# Patient Record
Sex: MALE | Race: WHITE | NOT HISPANIC OR LATINO | Employment: OTHER | URBAN - NONMETROPOLITAN AREA
[De-identification: names, ages, dates, MRNs, and addresses within clinical notes are randomized per-mention and may not be internally consistent; named-entity substitution may affect disease eponyms.]

---

## 2023-10-20 ENCOUNTER — OFFICE VISIT (OUTPATIENT)
Dept: PRIMARY CARE | Facility: CLINIC | Age: 73
End: 2023-10-20
Payer: MEDICARE

## 2023-10-20 VITALS
DIASTOLIC BLOOD PRESSURE: 74 MMHG | WEIGHT: 202 LBS | SYSTOLIC BLOOD PRESSURE: 116 MMHG | TEMPERATURE: 97.5 F | BODY MASS INDEX: 29.85 KG/M2 | OXYGEN SATURATION: 97 % | HEART RATE: 75 BPM

## 2023-10-20 DIAGNOSIS — I10 PRIMARY HYPERTENSION: Primary | ICD-10-CM

## 2023-10-20 DIAGNOSIS — N52.9 ERECTILE DYSFUNCTION, UNSPECIFIED ERECTILE DYSFUNCTION TYPE: ICD-10-CM

## 2023-10-20 DIAGNOSIS — I48.0 INTERMITTENT ATRIAL FIBRILLATION (MULTI): ICD-10-CM

## 2023-10-20 DIAGNOSIS — M75.51 BURSITIS OF RIGHT SHOULDER: ICD-10-CM

## 2023-10-20 DIAGNOSIS — M54.2 CERVICALGIA: ICD-10-CM

## 2023-10-20 PROCEDURE — 1159F MED LIST DOCD IN RCRD: CPT | Performed by: FAMILY MEDICINE

## 2023-10-20 PROCEDURE — 96372 THER/PROPH/DIAG INJ SC/IM: CPT | Performed by: FAMILY MEDICINE

## 2023-10-20 PROCEDURE — 3078F DIAST BP <80 MM HG: CPT | Performed by: FAMILY MEDICINE

## 2023-10-20 PROCEDURE — 3074F SYST BP LT 130 MM HG: CPT | Performed by: FAMILY MEDICINE

## 2023-10-20 PROCEDURE — 99204 OFFICE O/P NEW MOD 45 MIN: CPT | Performed by: FAMILY MEDICINE

## 2023-10-20 PROCEDURE — 1036F TOBACCO NON-USER: CPT | Performed by: FAMILY MEDICINE

## 2023-10-20 PROCEDURE — 20553 NJX 1/MLT TRIGGER POINTS 3/>: CPT | Performed by: FAMILY MEDICINE

## 2023-10-20 RX ORDER — LISINOPRIL 5 MG/1
5 TABLET ORAL
COMMUNITY
Start: 2014-08-12 | End: 2024-03-01 | Stop reason: WASHOUT

## 2023-10-20 RX ORDER — DEXTROMETHORPHAN HYDROBROMIDE, GUAIFENESIN 5; 100 MG/5ML; MG/5ML
650 LIQUID ORAL EVERY 8 HOURS PRN
Status: ON HOLD | COMMUNITY
End: 2024-02-02 | Stop reason: WASHOUT

## 2023-10-20 RX ORDER — TRIAMCINOLONE ACETONIDE 40 MG/ML
40 INJECTION, SUSPENSION INTRA-ARTICULAR; INTRAMUSCULAR ONCE
Status: COMPLETED | OUTPATIENT
Start: 2023-10-20 | End: 2023-10-20

## 2023-10-20 RX ORDER — SILDENAFIL 100 MG/1
100 TABLET, FILM COATED ORAL
COMMUNITY
Start: 2011-01-17 | End: 2023-10-20 | Stop reason: SDUPTHER

## 2023-10-20 RX ORDER — SILDENAFIL 100 MG/1
100 TABLET, FILM COATED ORAL AS NEEDED
Qty: 10 TABLET | Refills: 11 | Status: SHIPPED | OUTPATIENT
Start: 2023-10-20

## 2023-10-20 RX ORDER — LIDOCAINE HYDROCHLORIDE 20 MG/ML
40 INJECTION, SOLUTION INFILTRATION; PERINEURAL ONCE
Status: COMPLETED | OUTPATIENT
Start: 2023-10-20 | End: 2023-10-20

## 2023-10-20 RX ORDER — APIXABAN 5 MG/1
5 TABLET, FILM COATED ORAL 2 TIMES DAILY
COMMUNITY
Start: 2023-07-31 | End: 2024-05-06 | Stop reason: SDUPTHER

## 2023-10-20 RX ORDER — METOPROLOL TARTRATE 50 MG/1
50 TABLET ORAL DAILY
Status: ON HOLD | COMMUNITY
Start: 2023-08-10 | End: 2024-02-02 | Stop reason: DRUGHIGH

## 2023-10-20 RX ORDER — ASPIRIN 81 MG/1
81 TABLET ORAL DAILY
COMMUNITY
Start: 2014-01-22

## 2023-10-20 RX ADMIN — TRIAMCINOLONE ACETONIDE 40 MG: 40 INJECTION, SUSPENSION INTRA-ARTICULAR; INTRAMUSCULAR at 13:05

## 2023-10-20 RX ADMIN — LIDOCAINE HYDROCHLORIDE 40 MG: 20 INJECTION, SOLUTION INFILTRATION; PERINEURAL at 13:04

## 2023-10-20 ASSESSMENT — ENCOUNTER SYMPTOMS
VOMITING: 0
ARTHRALGIAS: 1
SLEEP DISTURBANCE: 0
SINUS PRESSURE: 0
SORE THROAT: 0
RHINORRHEA: 0
HEADACHES: 0
ABDOMINAL PAIN: 0
FLANK PAIN: 0
WHEEZING: 0
NERVOUS/ANXIOUS: 0
DYSPHORIC MOOD: 0
DYSURIA: 0
WEAKNESS: 0
NAUSEA: 0
BLOOD IN STOOL: 0
FEVER: 0
NECK PAIN: 1
NUMBNESS: 0
EYE ITCHING: 0
DIFFICULTY URINATING: 1
JOINT SWELLING: 0
SHORTNESS OF BREATH: 0
LIGHT-HEADEDNESS: 0
ACTIVITY CHANGE: 0
EYE DISCHARGE: 0
APPETITE CHANGE: 0
PALPITATIONS: 1
DIARRHEA: 0
UNEXPECTED WEIGHT CHANGE: 0
CONSTIPATION: 0
HEMATURIA: 0
MYALGIAS: 0
DIZZINESS: 0
COUGH: 0

## 2023-10-20 NOTE — PATIENT INSTRUCTIONS
PLAN A WELLNESS IN 2-24   LABS AHEAD OF TIME  CMP, LIPID PANEL   CONTINUE THE MEDICATIONS   CHRONIC BURSITIS IN RIGHT SHOULDER /kenalogue injected into trigger areas around the shoulder

## 2023-10-20 NOTE — PROGRESS NOTES
Subjective   Patient ID: Fabricio Bajwa is a 73 y.o. male who presents for Shoulder Pain (B-skrjyxfa-rlaipoo for a long time. Ilia does hip injection.).    HPI HAD MI AND PACER DEFIBRILLATOR IN 2014     Review of Systems   Constitutional:  Negative for activity change, appetite change, fever and unexpected weight change.   HENT:  Negative for congestion, ear pain, postnasal drip, rhinorrhea, sinus pressure and sore throat.    Eyes:  Negative for discharge, itching and visual disturbance.   Respiratory:  Negative for cough, shortness of breath and wheezing.    Cardiovascular:  Positive for palpitations. Negative for chest pain and leg swelling.        CAD A INTERMITTENT A FIB    Gastrointestinal:  Negative for abdominal pain, blood in stool, constipation, diarrhea, nausea and vomiting.   Endocrine: Negative for cold intolerance, heat intolerance and polyuria.   Genitourinary:  Positive for difficulty urinating. Negative for dysuria, flank pain and hematuria.        ED   Musculoskeletal:  Positive for arthralgias and neck pain. Negative for gait problem, joint swelling and myalgias.        Previous diagnosis of cervicalgia and got trigger point injections from prior pcp      Skin:  Negative for rash.   Allergic/Immunologic: Negative for environmental allergies and food allergies.   Neurological:  Negative for dizziness, syncope, weakness, light-headedness, numbness and headaches.   Psychiatric/Behavioral:  Negative for dysphoric mood and sleep disturbance. The patient is not nervous/anxious.        Objective   /74   Pulse 75   Temp 36.4 °C (97.5 °F)   Wt 91.6 kg (202 lb)   SpO2 97%   BMI 29.85 kg/m²     Physical Exam  Vitals and nursing note reviewed.   Constitutional:       Appearance: Normal appearance.   HENT:      Head: Normocephalic.      Mouth/Throat:      Mouth: Mucous membranes are moist.   Neck:      Comments: OA IN THE NECK WITH CERVICALGIA DOWN THE RIGHT ARM AND INTO THE SHOULDER    Cardiovascular:      Rate and Rhythm: Normal rate. Rhythm irregular.      Pulses: Normal pulses.      Heart sounds: Normal heart sounds. No murmur heard.     No friction rub. No gallop.   Pulmonary:      Effort: Pulmonary effort is normal. No respiratory distress.      Breath sounds: Normal breath sounds. No wheezing.   Abdominal:      General: Bowel sounds are normal. There is no distension.      Palpations: Abdomen is soft.      Tenderness: There is no abdominal tenderness.   Musculoskeletal:         General: Tenderness present. No deformity.      Cervical back: Rigidity and tenderness present.      Comments: NECK ON THE RIGHT INTO THE RIGHT SHOULDER   ROM SHOULDER IS GOOD PASSIVELY  LIMITED BY PAIN ACTIVELY    Skin:     General: Skin is warm and dry.      Capillary Refill: Capillary refill takes less than 2 seconds.   Neurological:      General: No focal deficit present.      Mental Status: He is alert and oriented to person, place, and time.   Psychiatric:         Mood and Affect: Mood normal.         Assessment/Plan   Patient ID: Fabricio Bajwa is a 73 y.o. male.    Joint Injection Large/Arthrocentesis (SUPRASCAPULAR AREA ON THE RIGHT ) on 10/20/2023 1:18 PM  Indications: pain  Details: 21 G needle, posterior approach  Aspirate: 0 mL    TOLERATED WELL WITH NO COMPLICATIONS   Procedure, treatment alternatives, risks and benefits explained, specific risks discussed. Consent was given by the patient. Immediately prior to procedure a time out was called to verify the correct patient, procedure, equipment, support staff and site/side marked as required. Patient was prepped and draped in the usual sterile fashion.       Diagnoses and all orders for this visit:  Primary hypertension  Intermittent atrial fibrillation (CMS/HCC)  Bursitis of right shoulder  Cervicalgia  Erectile dysfunction, unspecified erectile dysfunction type  -     sildenafil (Viagra) 100 mg tablet; Take 1 tablet (100 mg) by mouth if needed for  erectile dysfunction.

## 2023-10-27 ENCOUNTER — APPOINTMENT (OUTPATIENT)
Dept: INTEGRATIVE MEDICINE | Facility: CLINIC | Age: 73
End: 2023-10-27
Payer: MEDICARE

## 2023-10-31 RX ORDER — TRIAMCINOLONE ACETONIDE 40 MG/ML
40 INJECTION, SUSPENSION INTRA-ARTICULAR; INTRAMUSCULAR ONCE
Status: DISCONTINUED | OUTPATIENT
Start: 2023-10-20 | End: 2023-10-31

## 2023-11-29 ENCOUNTER — OFFICE VISIT (OUTPATIENT)
Dept: CARDIOLOGY | Facility: CLINIC | Age: 73
End: 2023-11-29
Payer: MEDICARE

## 2023-11-29 VITALS
DIASTOLIC BLOOD PRESSURE: 76 MMHG | SYSTOLIC BLOOD PRESSURE: 123 MMHG | OXYGEN SATURATION: 96 % | WEIGHT: 197 LBS | HEART RATE: 59 BPM | RESPIRATION RATE: 16 BRPM | BODY MASS INDEX: 29.18 KG/M2 | HEIGHT: 69 IN

## 2023-11-29 DIAGNOSIS — R06.00 DYSPNEA, UNSPECIFIED TYPE: ICD-10-CM

## 2023-11-29 DIAGNOSIS — I48.0 INTERMITTENT ATRIAL FIBRILLATION (MULTI): ICD-10-CM

## 2023-11-29 DIAGNOSIS — Z01.818 PREOP TESTING: ICD-10-CM

## 2023-11-29 PROBLEM — I25.10 CORONARY ARTERY DISEASE INVOLVING NATIVE CORONARY ARTERY: Status: ACTIVE | Noted: 2023-11-29

## 2023-11-29 PROBLEM — I25.5 CARDIOMYOPATHY, ISCHEMIC: Status: ACTIVE | Noted: 2023-11-29

## 2023-11-29 PROBLEM — Z95.810 PRESENCE OF AUTOMATIC (IMPLANTABLE) CARDIAC DEFIBRILLATOR: Status: ACTIVE | Noted: 2023-11-29

## 2023-11-29 PROCEDURE — 99215 OFFICE O/P EST HI 40 MIN: CPT | Mod: 25 | Performed by: INTERNAL MEDICINE

## 2023-11-29 PROCEDURE — 3078F DIAST BP <80 MM HG: CPT | Performed by: INTERNAL MEDICINE

## 2023-11-29 PROCEDURE — 93010 ELECTROCARDIOGRAM REPORT: CPT | Performed by: INTERNAL MEDICINE

## 2023-11-29 PROCEDURE — 1159F MED LIST DOCD IN RCRD: CPT | Performed by: INTERNAL MEDICINE

## 2023-11-29 PROCEDURE — 93005 ELECTROCARDIOGRAM TRACING: CPT | Performed by: INTERNAL MEDICINE

## 2023-11-29 PROCEDURE — 1036F TOBACCO NON-USER: CPT | Performed by: INTERNAL MEDICINE

## 2023-11-29 PROCEDURE — 99205 OFFICE O/P NEW HI 60 MIN: CPT | Performed by: INTERNAL MEDICINE

## 2023-11-29 PROCEDURE — 3074F SYST BP LT 130 MM HG: CPT | Performed by: INTERNAL MEDICINE

## 2023-11-29 ASSESSMENT — ENCOUNTER SYMPTOMS: DEPRESSION: 0

## 2023-11-29 ASSESSMENT — PATIENT HEALTH QUESTIONNAIRE - PHQ9
SUM OF ALL RESPONSES TO PHQ9 QUESTIONS 1 AND 2: 0
1. LITTLE INTEREST OR PLEASURE IN DOING THINGS: NOT AT ALL
2. FEELING DOWN, DEPRESSED OR HOPELESS: NOT AT ALL

## 2023-11-29 NOTE — ASSESSMENT & PLAN NOTE
"s/p CD defibrillator Medtronic, implanted (Mar 2014).  Serial number BB [id number], model number DD BB 1 D4 by Dr. Wilmer Patel.April 2023: Inappropriate AICD shock -according to [facility] cardiology note, was due to AF with RVR entering the VF zone and reset patient into NSR.   Metoprolol increased to 50 mg BID (from 25 mg BID) and reprogrammed VF zone to 194 bpm and f/u visit in July noted device .     Today he comes because he wants his defibrillator out. He has had inappropriate shocks and feels that the defibrillator is doing more harm than good. His initial device was placed in 2014 when after 6 months of optimal medical therapy the EF was found still to be around 35% and he had non sustained VT. Hence it was done for primary prevention. He had never had appropriate shock and his EF has recovered. We discussed the pros and cons of turning off the defibrillator vs removing it. He feels that even if he runs into an \"issue\" he lives so far away from a hospital that it would be impossible to obtain adequate care. Anyhow, the device has still more than 2 years to depletion and for now he will keep the device in.   "

## 2023-11-29 NOTE — ASSESSMENT & PLAN NOTE
s/p PCI to LCX (2014) Xience 2/5/2028 DONTAE, at [facility] during what appears to be acute MI    -[facility] 10/28/2022:  Native Coronary Artery Disease in the LCX (Patent stent in Mid Lcx, 20% mild tubular stenosis proximal to the stent.)       Continue ASA, Atorvastatin at current doses.

## 2023-11-29 NOTE — ASSESSMENT & PLAN NOTE
His EF back in 2021 was normal. LV dysfunction of 45% was noted on the coronary angiogram in October of 2022.  He had started fairly recently Lisinopril and Metoprolol the latter for atrial fibrillation. We will order an ECHO. He has lost significant amount of weight so this is commendable. He wants to stop the Lisinopril and Metoprolol. As the Metoprolol was started for atrial fibrillation and his EF is preserved I feel we can stop the Metoprolol. I will await for the ECHO to decide on Lisinopril he will continue it for now.

## 2023-11-29 NOTE — PROGRESS NOTES
I had the pleasure seeing Fabricio Bajwa     Chief Complaint   Patient presents with    Atrial Fibrillation    Cardiomyopathy     OUTPATIENT CONSULTATION: Cardiac Electrophysiology  DOS: November 29, 2023  REASON: AF 2ND opinion and ICM s/p ICD implanted s/p inappropriate shock  REFERRING PHYSICIAN: Self referral.  He sees DR. Raghu Hammer at [facility]          Current Outpatient Medications on File Prior to Visit   Medication Sig Dispense Refill    acetaminophen (Tylenol 8 HOUR) 650 mg ER tablet Take 1 tablet (650 mg) by mouth every 8 hours if needed for mild pain (1 - 3). Do not crush, chew, or split.      aspirin 81 mg EC tablet Take by mouth.      Eliquis 5 mg tablet Take 1 tablet (5 mg) by mouth twice a day.      lisinopril 5 mg tablet Take 1 tablet (5 mg) by mouth once daily.      metoprolol tartrate (Lopressor) 50 mg tablet Take 1 tablet by mouth once daily.      multivitamin capsule Take 1 capsule by mouth once daily.      sildenafil (Viagra) 100 mg tablet Take 1 tablet (100 mg) by mouth if needed for erectile dysfunction. 10 tablet 11    TURMERIC ORAL Take by mouth.       No current facility-administered medications on file prior to visit.          Fabricio Bajwa is a 73 y.o. with:       Hx of MI / CAD - s/p PCI to LCX (2014) Xience 2/5/2028 DONTAE, at [facility],   2. ICM  - s/p CD defibrillator Medtronic, implanted (Mar 2014).  Serial number BB [id number], model number DD BB 1 D4 by Dr. Wilmer Patel.  3. Paroxysmal AF     Oct 2022:  AT / AF plus SVT noted on Dual-chamber ICD check       April 2023: Inappropriate AICD shock -according to [facility] cardiology note, was due to AF with RVR entering the VF zone and reset patient into NSR.   Metoprolol increased to 50 mg BID (from 25 mg BID) and reprogrammed VF zone to 194 bpm and f/u visit in July noted device interrogation STATUS:   Battery: The battery shows MOL depletion, estimate 2.7 years to PEDRO.   Lead(s): Lead impedence, sensing, thresholds  "are reported satisfactory.   Therapies: None   Atrial arrhythmias: AT/AF 2 events, longest 39 sec, burden < 0.1%   VHR/VT/VF: 629 NSVT            TESTING:      -Nuclear Stress:  (May 2022) Mild (>10%) ischemia in LCx.  Large (>20%) fixed perfusion defect in LCx.   LV mildly dilated and systolic function mildly decreased.  LVEF 45%.      -LHC: (Oct 2022) Native Coronary Artery Disease in the LCX (Patent stent in Mid Lcx, 20% mild tubular stenosis proximal to the stent.) and Mild LV dysfunction EF 45%    -TTE:  (May 2021) LVEF 53 +/- 5%.  Mild LVH.  Suboptimal image quality noted.        Objective   Physical Exam  /76 (BP Location: Left arm, Patient Position: Sitting, BP Cuff Size: Large adult)   Pulse 59   Resp 16   Ht 1.753 m (5' 9\")   Wt 89.4 kg (197 lb)   SpO2 96%   BMI 29.09 kg/m²     General Appearance:  Alert, cooperative, no distress, appears stated age   Head:  Normocephalic, without obvious abnormality, atraumatic   Eyes:  PERRL, conjunctiva/corneas clear, EOM's intact, fundi benign, both eyes   Ears:  Normal TM's and external ear canals, both ears   Nose: Nares normal, septum midline, mucosa normal, no drainage or sinus tenderness   Throat: Lips, mucosa, and tongue normal; teeth and gums normal   Neck: Supple, symmetrical, trachea midline, no adenopathy, thyroid: not enlarged, symmetric, no tenderness/mass/nodules, no carotid bruit or JVD   Back:   Symmetric, no curvature, ROM normal, no CVA tenderness   Lungs:   Clear to auscultation bilaterally, respirations unlabored   Chest Wall:  No tenderness or deformity   Heart:  Regular rate and rhythm, S1, S2 normal, no murmur, rub or gallop   Abdomen:   Soft, non-tender, bowel sounds active all four quadrants,  no masses, no organomegaly   Genitalia:  Normal male   Rectal:  Normal tone, normal prostate, no masses or tenderness;  guaiac negative stool   Extremities: Extremities normal, atraumatic, no cyanosis or edema   Pulses: 2+ and symmetric   Skin: " "Skin color, texture, turgor normal, no rashes or lesions   Lymph nodes: Cervical, supraclavicular, and axillary nodes normal   Neurologic: Normal        Blood pressure 123/76, pulse 59, resp. rate 16, height 1.753 m (5' 9\"), weight 89.4 kg (197 lb), SpO2 96 %.     Assessment/Plan   Coronary artery disease involving native coronary artery  s/p PCI to LCX (2014) Xience 2/5/2028 DONTAE, at [facility] during what appears to be acute MI    -[facility] 10/28/2022:  Native Coronary Artery Disease in the LCX (Patent stent in Mid Lcx, 20% mild tubular stenosis proximal to the stent.)       Continue ASA, Atorvastatin at current doses.    Cardiomyopathy, ischemic  His EF back in 2021 was normal. LV dysfunction of 45% was noted on the coronary angiogram in October of 2022.  He had started fairly recently Lisinopril and Metoprolol the latter for atrial fibrillation. We will order an ECHO. He has lost significant amount of weight so this is commendable. He wants to stop the Lisinopril and Metoprolol. As the Metoprolol was started for atrial fibrillation and his EF is preserved I feel we can stop the Metoprolol. I will await for the ECHO to decide on Lisinopril he will continue it for now.    Presence of automatic (implantable) cardiac defibrillator  s/p CD defibrillator Medtronic, implanted (Mar 2014).  Serial number BB [id number], model number DD BB 1 D4 by Dr. Wilmer Patel.April 2023: Inappropriate AICD shock -according to [facility] cardiology note, was due to AF with RVR entering the VF zone and reset patient into NSR.   Metoprolol increased to 50 mg BID (from 25 mg BID) and reprogrammed VF zone to 194 bpm and f/u visit in July noted device .     Today he comes because he wants his defibrillator out. He has had inappropriate shocks and feels that the defibrillator is doing more harm than good. His initial device was placed in 2014 when after 6 months of optimal medical therapy the EF was found still to be around 35% and he " "had non sustained VT. Hence it was done for primary prevention. He had never had appropriate shock and his EF has recovered. We discussed the pros and cons of turning off the defibrillator vs removing it. He feels that even if he runs into an \"issue\" he lives so far away from a hospital that it would be impossible to obtain adequate care. Anyhow, the device has still more than 2 years to depletion and for now he will keep the device in.     Intermittent atrial fibrillation (CMS/HCC)  This is another issue that bothers him. He apparently has a very low burden of AF but it is worried about the inappropriate shocks. His device check shows AF, AT and SVT.  He wants to stop the Eliquis. We discussed the issues for not doing it for now. He also wants definitive solution for the AF. We discussed 1. Observation 2. Antiarrhythmic therapy (he is clearly against that) and 3. PVI. He prefers to have the PVI and not to think at all about the AF. Tentatively we are scheduling it for February 2nd, 2023.             "

## 2023-11-29 NOTE — ASSESSMENT & PLAN NOTE
This is another issue that bothers him. He apparently has a very low burden of AF but it is worried about the inappropriate shocks. His device check shows AF, AT and SVT.  He wants to stop the Eliquis. We discussed the issues for not doing it for now. He also wants definitive solution for the AF. We discussed 1. Observation 2. Antiarrhythmic therapy (he is clearly against that) and 3. PVI. He prefers to have the PVI and not to think at all about the AF. Tentatively we are scheduling it for February 2nd, 2023.

## 2023-12-06 ENCOUNTER — HOSPITAL ENCOUNTER (OUTPATIENT)
Dept: CARDIOLOGY | Facility: CLINIC | Age: 73
Discharge: HOME | End: 2023-12-06
Payer: MEDICARE

## 2023-12-06 DIAGNOSIS — I48.91 UNSPECIFIED ATRIAL FIBRILLATION (MULTI): ICD-10-CM

## 2023-12-06 DIAGNOSIS — I48.0 INTERMITTENT ATRIAL FIBRILLATION (MULTI): ICD-10-CM

## 2023-12-06 DIAGNOSIS — R06.00 DYSPNEA, UNSPECIFIED TYPE: ICD-10-CM

## 2023-12-06 PROCEDURE — 93306 TTE W/DOPPLER COMPLETE: CPT | Performed by: INTERNAL MEDICINE

## 2023-12-06 PROCEDURE — 93306 TTE W/DOPPLER COMPLETE: CPT

## 2023-12-07 LAB
AORTIC VALVE PEAK VELOCITY: 1.41
AV PEAK GRADIENT: 8
AVA (PEAK VEL): 2.99
EJECTION FRACTION APICAL 4 CHAMBER: 45
EJECTION FRACTION: 46
LEFT ATRIUM VOLUME AREA LENGTH INDEX BSA: 34.1
LEFT VENTRICLE INTERNAL DIMENSION DIASTOLE: 4.8 (ref 3.5–6)
LEFT VENTRICULAR OUTFLOW TRACT DIAMETER: 2.2
MITRAL VALVE E/A RATIO: 1.33
MITRAL VALVE E/E' RATIO: 14.4
RIGHT VENTRICLE FREE WALL PEAK S': 12.8
RIGHT VENTRICLE PEAK SYSTOLIC PRESSURE: 37.3
TRICUSPID ANNULAR PLANE SYSTOLIC EXCURSION: 2.4

## 2024-01-04 LAB
ATRIAL RATE: 60 BPM
P AXIS: 68 DEGREES
P OFFSET: 186 MS
P ONSET: 125 MS
PR INTERVAL: 190 MS
Q ONSET: 220 MS
QRS COUNT: 10 BEATS
QRS DURATION: 84 MS
QT INTERVAL: 410 MS
QTC CALCULATION(BAZETT): 410 MS
QTC FREDERICIA: 410 MS
R AXIS: -33 DEGREES
T AXIS: 7 DEGREES
T OFFSET: 425 MS
VENTRICULAR RATE: 60 BPM

## 2024-01-24 ENCOUNTER — LAB (OUTPATIENT)
Dept: LAB | Facility: LAB | Age: 74
End: 2024-01-24
Payer: MEDICARE

## 2024-01-24 DIAGNOSIS — I48.0 INTERMITTENT ATRIAL FIBRILLATION (MULTI): ICD-10-CM

## 2024-01-24 DIAGNOSIS — Z01.818 PREOP TESTING: ICD-10-CM

## 2024-01-24 LAB
ANION GAP SERPL CALC-SCNC: 12 MMOL/L (ref 10–20)
BUN SERPL-MCNC: 19 MG/DL (ref 6–23)
CALCIUM SERPL-MCNC: 9.2 MG/DL (ref 8.6–10.3)
CHLORIDE SERPL-SCNC: 104 MMOL/L (ref 98–107)
CO2 SERPL-SCNC: 30 MMOL/L (ref 21–32)
CREAT SERPL-MCNC: 0.96 MG/DL (ref 0.5–1.3)
EGFRCR SERPLBLD CKD-EPI 2021: 85 ML/MIN/1.73M*2
ERYTHROCYTE [DISTWIDTH] IN BLOOD BY AUTOMATED COUNT: 14.3 % (ref 11.5–14.5)
GLUCOSE SERPL-MCNC: 106 MG/DL (ref 74–99)
HCT VFR BLD AUTO: 45.3 % (ref 41–52)
HGB BLD-MCNC: 14.8 G/DL (ref 13.5–17.5)
INR PPP: 1.1 (ref 0.9–1.1)
MCH RBC QN AUTO: 32 PG (ref 26–34)
MCHC RBC AUTO-ENTMCNC: 32.7 G/DL (ref 32–36)
MCV RBC AUTO: 98 FL (ref 80–100)
NRBC BLD-RTO: 0 /100 WBCS (ref 0–0)
PLATELET # BLD AUTO: 160 X10*3/UL (ref 150–450)
POTASSIUM SERPL-SCNC: 4.2 MMOL/L (ref 3.5–5.3)
PROTHROMBIN TIME: 12.1 SECONDS (ref 9.8–12.8)
RBC # BLD AUTO: 4.63 X10*6/UL (ref 4.5–5.9)
SODIUM SERPL-SCNC: 142 MMOL/L (ref 136–145)
WBC # BLD AUTO: 6.8 X10*3/UL (ref 4.4–11.3)

## 2024-01-24 PROCEDURE — 36415 COLL VENOUS BLD VENIPUNCTURE: CPT

## 2024-02-01 ENCOUNTER — ANESTHESIA EVENT (OUTPATIENT)
Dept: CARDIOLOGY | Facility: HOSPITAL | Age: 74
End: 2024-02-01
Payer: MEDICARE

## 2024-02-02 ENCOUNTER — APPOINTMENT (OUTPATIENT)
Dept: CARDIOLOGY | Facility: HOSPITAL | Age: 74
End: 2024-02-02
Payer: MEDICARE

## 2024-02-02 ENCOUNTER — ANESTHESIA (OUTPATIENT)
Dept: CARDIOLOGY | Facility: HOSPITAL | Age: 74
End: 2024-02-02
Payer: MEDICARE

## 2024-02-02 ENCOUNTER — HOSPITAL ENCOUNTER (OUTPATIENT)
Facility: HOSPITAL | Age: 74
Setting detail: OUTPATIENT SURGERY
Discharge: HOME | End: 2024-02-02
Attending: INTERNAL MEDICINE | Admitting: INTERNAL MEDICINE
Payer: MEDICARE

## 2024-02-02 VITALS
HEART RATE: 78 BPM | TEMPERATURE: 96.8 F | RESPIRATION RATE: 14 BRPM | OXYGEN SATURATION: 100 % | SYSTOLIC BLOOD PRESSURE: 115 MMHG | DIASTOLIC BLOOD PRESSURE: 58 MMHG

## 2024-02-02 DIAGNOSIS — I48.0 PAROXYSMAL ATRIAL FIBRILLATION (MULTI): ICD-10-CM

## 2024-02-02 DIAGNOSIS — I25.5 CARDIOMYOPATHY, ISCHEMIC: Primary | ICD-10-CM

## 2024-02-02 PROCEDURE — 85347 COAGULATION TIME ACTIVATED: CPT

## 2024-02-02 PROCEDURE — 2500000005 HC RX 250 GENERAL PHARMACY W/O HCPCS

## 2024-02-02 PROCEDURE — 2500000004 HC RX 250 GENERAL PHARMACY W/ HCPCS (ALT 636 FOR OP/ED): Performed by: NURSE ANESTHETIST, CERTIFIED REGISTERED

## 2024-02-02 PROCEDURE — 2500000004 HC RX 250 GENERAL PHARMACY W/ HCPCS (ALT 636 FOR OP/ED)

## 2024-02-02 PROCEDURE — 93287 PERI-PX DEVICE EVAL & PRGR: CPT | Performed by: INTERNAL MEDICINE

## 2024-02-02 PROCEDURE — C1766 INTRO/SHEATH,STRBLE,NON-PEEL: HCPCS | Performed by: INTERNAL MEDICINE

## 2024-02-02 PROCEDURE — C1730 CATH, EP, 19 OR FEW ELECT: HCPCS | Performed by: INTERNAL MEDICINE

## 2024-02-02 PROCEDURE — C1759 CATH, INTRA ECHOCARDIOGRAPHY: HCPCS | Performed by: INTERNAL MEDICINE

## 2024-02-02 PROCEDURE — 93657 TX L/R ATRIAL FIB ADDL: CPT | Performed by: INTERNAL MEDICINE

## 2024-02-02 PROCEDURE — C1732 CATH, EP, DIAG/ABL, 3D/VECT: HCPCS | Performed by: INTERNAL MEDICINE

## 2024-02-02 PROCEDURE — G0269 OCCLUSIVE DEVICE IN VEIN ART: HCPCS | Mod: TC,59 | Performed by: INTERNAL MEDICINE

## 2024-02-02 PROCEDURE — A93656 PR EPHYS EVL TRNSPTL TX ATRIAL FIB ISOLAT PULM VEIN: Performed by: NURSE ANESTHETIST, CERTIFIED REGISTERED

## 2024-02-02 PROCEDURE — 93656 COMPRE EP EVAL ABLTJ ATR FIB: CPT | Performed by: INTERNAL MEDICINE

## 2024-02-02 PROCEDURE — 2720000007 HC OR 272 NO HCPCS: Performed by: INTERNAL MEDICINE

## 2024-02-02 PROCEDURE — 36620 INSERTION CATHETER ARTERY: CPT | Performed by: ANESTHESIOLOGY

## 2024-02-02 PROCEDURE — C1760 CLOSURE DEV, VASC: HCPCS | Performed by: INTERNAL MEDICINE

## 2024-02-02 PROCEDURE — 2500000005 HC RX 250 GENERAL PHARMACY W/O HCPCS: Performed by: NURSE ANESTHETIST, CERTIFIED REGISTERED

## 2024-02-02 PROCEDURE — 2780000003 HC OR 278 NO HCPCS: Performed by: INTERNAL MEDICINE

## 2024-02-02 PROCEDURE — 93287 PERI-PX DEVICE EVAL & PRGR: CPT

## 2024-02-02 PROCEDURE — A93656 PR EPHYS EVL TRNSPTL TX ATRIAL FIB ISOLAT PULM VEIN: Performed by: ANESTHESIOLOGY

## 2024-02-02 PROCEDURE — 85347 COAGULATION TIME ACTIVATED: CPT | Performed by: INTERNAL MEDICINE

## 2024-02-02 PROCEDURE — 99100 ANES PT EXTEME AGE<1 YR&>70: CPT | Performed by: ANESTHESIOLOGY

## 2024-02-02 PROCEDURE — 2500000004 HC RX 250 GENERAL PHARMACY W/ HCPCS (ALT 636 FOR OP/ED): Performed by: INTERNAL MEDICINE

## 2024-02-02 PROCEDURE — 7100000010 HC PHASE TWO TIME - EACH INCREMENTAL 1 MINUTE: Performed by: INTERNAL MEDICINE

## 2024-02-02 PROCEDURE — 93287 PERI-PX DEVICE EVAL & PRGR: CPT | Mod: CCI

## 2024-02-02 PROCEDURE — 7100000009 HC PHASE TWO TIME - INITIAL BASE CHARGE: Performed by: INTERNAL MEDICINE

## 2024-02-02 PROCEDURE — 3700000001 HC GENERAL ANESTHESIA TIME - INITIAL BASE CHARGE: Performed by: INTERNAL MEDICINE

## 2024-02-02 PROCEDURE — 3700000002 HC GENERAL ANESTHESIA TIME - EACH INCREMENTAL 1 MINUTE: Performed by: INTERNAL MEDICINE

## 2024-02-02 RX ORDER — ROCURONIUM BROMIDE 10 MG/ML
INJECTION, SOLUTION INTRAVENOUS AS NEEDED
Status: DISCONTINUED | OUTPATIENT
Start: 2024-02-02 | End: 2024-02-02

## 2024-02-02 RX ORDER — MIDAZOLAM HYDROCHLORIDE 1 MG/ML
INJECTION INTRAMUSCULAR; INTRAVENOUS AS NEEDED
Status: DISCONTINUED | OUTPATIENT
Start: 2024-02-02 | End: 2024-02-02

## 2024-02-02 RX ORDER — HEPARIN SODIUM 10000 [USP'U]/100ML
INJECTION, SOLUTION INTRAVENOUS CONTINUOUS PRN
Status: DISCONTINUED | OUTPATIENT
Start: 2024-02-02 | End: 2024-02-02 | Stop reason: HOSPADM

## 2024-02-02 RX ORDER — LIDOCAINE HYDROCHLORIDE 20 MG/ML
INJECTION, SOLUTION EPIDURAL; INFILTRATION; INTRACAUDAL; PERINEURAL AS NEEDED
Status: DISCONTINUED | OUTPATIENT
Start: 2024-02-02 | End: 2024-02-02

## 2024-02-02 RX ORDER — SODIUM CHLORIDE, SODIUM LACTATE, POTASSIUM CHLORIDE, CALCIUM CHLORIDE 600; 310; 30; 20 MG/100ML; MG/100ML; MG/100ML; MG/100ML
INJECTION, SOLUTION INTRAVENOUS CONTINUOUS PRN
Status: DISCONTINUED | OUTPATIENT
Start: 2024-02-02 | End: 2024-02-02

## 2024-02-02 RX ORDER — PANTOPRAZOLE SODIUM 40 MG/1
40 TABLET, DELAYED RELEASE ORAL 2 TIMES DAILY
Qty: 60 TABLET | Refills: 0 | Status: SHIPPED | OUTPATIENT
Start: 2024-02-02 | End: 2024-03-06 | Stop reason: WASHOUT

## 2024-02-02 RX ORDER — PHENYLEPHRINE HCL IN 0.9% NACL 0.4MG/10ML
SYRINGE (ML) INTRAVENOUS AS NEEDED
Status: DISCONTINUED | OUTPATIENT
Start: 2024-02-02 | End: 2024-02-02

## 2024-02-02 RX ORDER — FENTANYL CITRATE 50 UG/ML
INJECTION, SOLUTION INTRAMUSCULAR; INTRAVENOUS AS NEEDED
Status: DISCONTINUED | OUTPATIENT
Start: 2024-02-02 | End: 2024-02-02

## 2024-02-02 RX ORDER — PROTAMINE SULFATE 10 MG/ML
INJECTION, SOLUTION INTRAVENOUS AS NEEDED
Status: DISCONTINUED | OUTPATIENT
Start: 2024-02-02 | End: 2024-02-02

## 2024-02-02 RX ORDER — HEPARIN SODIUM 1000 [USP'U]/ML
INJECTION, SOLUTION INTRAVENOUS; SUBCUTANEOUS AS NEEDED
Status: DISCONTINUED | OUTPATIENT
Start: 2024-02-02 | End: 2024-02-02 | Stop reason: HOSPADM

## 2024-02-02 RX ORDER — METOPROLOL TARTRATE 50 MG/1
50 TABLET ORAL 2 TIMES DAILY
COMMUNITY
End: 2024-02-07 | Stop reason: SDUPTHER

## 2024-02-02 RX ORDER — PROPOFOL 10 MG/ML
INJECTION, EMULSION INTRAVENOUS AS NEEDED
Status: DISCONTINUED | OUTPATIENT
Start: 2024-02-02 | End: 2024-02-02

## 2024-02-02 RX ORDER — PHENYLEPHRINE 10 MG/250 ML(40 MCG/ML)IN 0.9 % SOD.CHLORIDE INTRAVENOUS
CONTINUOUS PRN
Status: DISCONTINUED | OUTPATIENT
Start: 2024-02-02 | End: 2024-02-02

## 2024-02-02 RX ADMIN — Medication 80 MCG: at 14:02

## 2024-02-02 RX ADMIN — FENTANYL CITRATE 50 MCG: 50 INJECTION, SOLUTION INTRAMUSCULAR; INTRAVENOUS at 12:50

## 2024-02-02 RX ADMIN — LIDOCAINE HYDROCHLORIDE 60 MG: 20 INJECTION, SOLUTION EPIDURAL; INFILTRATION; INTRACAUDAL; PERINEURAL at 12:50

## 2024-02-02 RX ADMIN — PROPOFOL 120 MG: 10 INJECTION, EMULSION INTRAVENOUS at 12:50

## 2024-02-02 RX ADMIN — Medication 80 MCG: at 12:51

## 2024-02-02 RX ADMIN — SUGAMMADEX 200 MG: 100 INJECTION, SOLUTION INTRAVENOUS at 16:14

## 2024-02-02 RX ADMIN — ROCURONIUM BROMIDE 30 MG: 10 INJECTION INTRAVENOUS at 13:31

## 2024-02-02 RX ADMIN — Medication 120 MCG: at 13:04

## 2024-02-02 RX ADMIN — ROCURONIUM BROMIDE 30 MG: 10 INJECTION INTRAVENOUS at 14:18

## 2024-02-02 RX ADMIN — Medication 0.5 MCG/KG/MIN: at 13:14

## 2024-02-02 RX ADMIN — ROCURONIUM BROMIDE 60 MG: 10 INJECTION INTRAVENOUS at 12:50

## 2024-02-02 RX ADMIN — ROCURONIUM BROMIDE 20 MG: 10 INJECTION INTRAVENOUS at 15:43

## 2024-02-02 RX ADMIN — SODIUM CHLORIDE, POTASSIUM CHLORIDE, SODIUM LACTATE AND CALCIUM CHLORIDE: 600; 310; 30; 20 INJECTION, SOLUTION INTRAVENOUS at 12:33

## 2024-02-02 RX ADMIN — SODIUM CHLORIDE, SODIUM LACTATE, POTASSIUM CHLORIDE, AND CALCIUM CHLORIDE: 600; 310; 30; 20 INJECTION, SOLUTION INTRAVENOUS at 13:09

## 2024-02-02 RX ADMIN — PROTAMINE SULFATE 30 MG: 10 INJECTION, SOLUTION INTRAVENOUS at 16:14

## 2024-02-02 RX ADMIN — MIDAZOLAM HYDROCHLORIDE 2 MG: 1 INJECTION, SOLUTION INTRAMUSCULAR; INTRAVENOUS at 12:31

## 2024-02-02 SDOH — HEALTH STABILITY: MENTAL HEALTH: CURRENT SMOKER: 0

## 2024-02-02 ASSESSMENT — ENCOUNTER SYMPTOMS
FATIGUE: 1
PALPITATIONS: 1

## 2024-02-02 ASSESSMENT — COLUMBIA-SUICIDE SEVERITY RATING SCALE - C-SSRS
6. HAVE YOU EVER DONE ANYTHING, STARTED TO DO ANYTHING, OR PREPARED TO DO ANYTHING TO END YOUR LIFE?: NO
1. IN THE PAST MONTH, HAVE YOU WISHED YOU WERE DEAD OR WISHED YOU COULD GO TO SLEEP AND NOT WAKE UP?: NO
2. HAVE YOU ACTUALLY HAD ANY THOUGHTS OF KILLING YOURSELF?: NO

## 2024-02-02 NOTE — DISCHARGE INSTRUCTIONS
INSTRUCTIONS AFTER ABLATION PROCEDURE:    * You will need to continue blood thinner (Eliquis) until instructed otherwise. It is important not to interrupt blood thinner for any reason (other than an emergency) during the first 30 days after ablation.    * You will be on Pantoprazole (a heartburn medicine) for 4 weeks to protect the esophagus as it can become irritated with ablation. It is very important that you take this medication.    * All other medications will generally remain the same unless you are told otherwise.  Resume taking your home medications today (including blood thinner) as listed on the discharge instructions.    * In the first week post-ablation you should take it easy. No heavy lifting or heavy exercise, no treadmill. You can use the stairs if needed but go slowly and minimize the number of times up and down.    * Some minor bruising is common at each groin access site with minor soreness as if you had banged the area. Bruising may occasionally be seen to extend down the leg. This is normal as is an occasional small quarter sized bump in the area. If larger swelling or more significant pain occurs at the area, please contact the office or go the nearest Emergency Room.    * You may have some minor chest pain for the next week or so. The pain will often worsen with a deep breath and be better when leaning forward. This is pericardial chest pain from the ablation and is generally not of concern. It should resolve within a week although it might increase for a day or so after the ablation.    * If you develop unexplained fevers exceeding 100 degrees anytime within the first 3 weeks post-ablation, you need to contact the office. Low grade fevers of around 99 degrees are common in the first day or so post-ablation.    * Atrial fibrillation (AFib) can recur in all patients who undergo this ablation for up to 4-8 weeks post-ablation. The ablation itself can cause inflammation (pericarditis) in the  atria and this can cause AFib. Some patients will actually experience an increased amount of atrial arrhythmia early after ablation. Approximately 1/3 of patients will have this early recurrence of AFib. Medications should be continued and your heart rate controlled. Nothing else needs be done initially except waiting as in many cases these episodes of AFib will prove self limited.    * Continue to follow up with your primary care physician, primary cardiologist, and any other specialists you normally see.    *No driving for 2 days post procedure (IF you were driving prior to procedure)    *Diet: Heart healthy    Call Provider If:  Breathing faster than normal.     Fever of 100.4 F (38 C) or higher.     Chills.     Any new concerning symptoms.     Passing out.     Patient Instructions, Next 24 hours:  DO NOT drive a car, operate machinery or power tools.  It is recommended that a responsible adult be with you for the first 24 hours.     DO NOT drink any alcoholic drinks or take any non-prescriptive medications that contain alcohol for the first 24 hours.     DO NOT make any important decisions for the first 24 hours.    Activity:  You are advised to go directly home from the hospital.     DO NOT lift anything heavier than 10 pounds for one week, this allows for proper healing of the groin.     No excessive exercise or treadmill use for one week. You may walk and do stairs, slowly.     No sexual activities for 24 hours after you arrive home.    Wound Care:  If slight bleeding should occur at groin site, lie down and have someone apply firm pressure just above the puncture site for 5 minutes.  If it continues or is profuse, call 911. Always notify your doctor if bleeding occurs.     Keep site clean and dry. Let air dry or you may use a simple bandaid.     Gently cleanse the puncture site in your groin with soap and water only.     You may experience some tenderness, bruising or minimal inflammation.  If you have any  concerns, you may contact the EP Lab or if any of these symptoms become excessive, contact your electrophysiologist or go to the emergency room.     No tub baths, soaking, hot tubs, or swimming for one week.     May shower the next day after your procedure.    Other Instructions:  If you have any questions about the effects of the sedative drugs or groin care, call the physician who performed your procedure.    FOLLOW UP:  1) Primary care physician 2 weeks--call to schedule    2) Leticia Encinas CNP ( Electrophysiology) 1 month after ablation as scheduled

## 2024-02-02 NOTE — PROGRESS NOTES
Pharmacy Medication History Review    Fabricio Bajwa is a 73 y.o. male admitted for No Principal Problem: There is no principal problem currently on the Problem List. Please update the Problem List and refresh.. Pharmacy reviewed the patient's [id number] medications and allergies for accuracy.    The list below reflects the updated PTA list.   Comments regarding how patient may be taking medications differently can be found in the Admit Orders Activity  Prior to Admission Medications   Prescriptions Last Dose Informant Patient Reported?   Eliquis 5 mg tablet 2/1/2024 at AM Self Yes   Sig: Take 1 tablet (5 mg) by mouth twice a day.   aspirin 81 mg EC tablet 2/1/2024 Self Yes   Sig: Take 1 tablet (81 mg) by mouth once daily.   lisinopril 5 mg tablet Not taking Self Yes   Sig: Take 1 tablet (5 mg) by mouth once daily.   metoprolol tartrate (Lopressor) 50 mg tablet 2/1/2024 Self Yes   Sig: Take 1 tablet by mouth 2 times a day.  --> taking 50 mg once daily   multivitamin capsule  Self Yes   Sig: Take 1 capsule by mouth once daily.   sildenafil (Viagra) 100 mg tablet >24 hours Self No   Sig: Take 1 tablet (100 mg) by mouth if needed for erectile dysfunction.      Facility-Administered Medications: None    a    The list below reflects the updated allergy list. Please review each documented allergy for additional clarification and justification.  Allergies  Reviewed by Torres Kay RP on 2/2/2024        Severity Reactions Comments    Hydrocodone-acetaminophen Not Specified Nausea Only nausea and lightheaded    Oxycodone Not Specified Itching             M2B service not offered prior to surgery, please reassess prior to patient discharge if Meds to Beds is desired.     Sources:   Pt interview  Dispense hx  [place]RS   11/29/23 cardiology A&P edit ([facility])   11/29/23 cardiology note ([facility])     Additional Comments:  Pt discussed interest in discontinuing metoprolol and lisinopril at cardiology visit on 11/29.  "Cardiologist documents okay with discontinuing metoprolol. Pt is still taking metoprolol, last dose yesterday 2/1/24, noted pending ECHO results before discontinuing lisinopril. Today, pt reports not taking lisinopril. No documentation found instructing pt to discontinue lisinopril. ECHO is resulted.  Metoprolol tart 50 mg - current Rx is 50 mg BID, pt taking 50 mg daily.        Torres Kay, PharmD  Transitions of Care Pharmacist    Secure Chat preferred   If no response call [phone] or Vocera \"Med Rec\"     "

## 2024-02-02 NOTE — ANESTHESIA PROCEDURE NOTES
Arterial Line:    Date/Time: 2/2/2024 1:16 PM    Staffing  Performed: CRNA   Authorized by: Terrence Montanez MD    Performed by: Jayson Gonzalez RN    An arterial line was placed. in the OR for the following indication(s): continuous blood pressure monitoring.    A 20 gauge (size), 1 and 3/4 inch (length), Angiocath (type) catheter was placed into the Left radial artery, secured by Tegaderm,   Seldinger technique used.  Events:  patient tolerated procedure well with no complications.

## 2024-02-02 NOTE — H&P
History Of Present Illness  Fabricio Bajwa is a 73 y.o. male presenting with pAF.     Past Medical History  No past medical history on file.    Surgical History  Past Surgical History:   Procedure Laterality Date    BACK SURGERY      1-5 decompressed    NECK SURGERY      cage with bone graft        Social History  He reports that he has quit smoking. His smoking use included cigarettes. He has never used smokeless tobacco. He reports that he does not currently use alcohol. He reports that he does not use drugs.    Family History  Family History   Problem Relation Name Age of Onset    COPD Mother      Alcohol abuse Father      Heart disease Father      Kidney disease Sister      Diabetes Sister      COPD Sister      Heart disease Sister      Asthma Sister      No Known Problems Sister      Liver disease Sister      Hepatitis Sister      Hepatitis Brother          Allergies  Hydrocodone-acetaminophen and Oxycodone    Review of Systems   Constitutional:  Positive for fatigue.   Cardiovascular:  Positive for palpitations.   All other systems reviewed and are negative.       Physical Exam  Vitals reviewed.   Constitutional:       General: He is not in acute distress.     Appearance: Normal appearance. He is normal weight.   HENT:      Head: Normocephalic and atraumatic.      Nose: Nose normal. No congestion or rhinorrhea.      Mouth/Throat:      Mouth: Mucous membranes are moist.      Pharynx: Oropharynx is clear. No oropharyngeal exudate or posterior oropharyngeal erythema.   Eyes:      Extraocular Movements: Extraocular movements intact.      Conjunctiva/sclera: Conjunctivae normal.      Pupils: Pupils are equal, round, and reactive to light.   Neck:      Vascular: No carotid bruit.   Cardiovascular:      Rate and Rhythm: Normal rate and regular rhythm.      Pulses: Normal pulses.      Heart sounds: Normal heart sounds. No murmur heard.     No friction rub. No gallop.   Pulmonary:      Effort: Pulmonary effort is  normal. No respiratory distress.      Breath sounds: Normal breath sounds. No wheezing or rales.   Abdominal:      General: Abdomen is flat. Bowel sounds are normal. There is no distension.      Palpations: Abdomen is soft.      Tenderness: There is no abdominal tenderness.   Musculoskeletal:         General: No swelling. Normal range of motion.      Cervical back: Normal range of motion.   Lymphadenopathy:      Cervical: No cervical adenopathy.   Skin:     General: Skin is warm and dry.      Capillary Refill: Capillary refill takes less than 2 seconds.      Findings: No erythema, lesion or rash.   Neurological:      General: No focal deficit present.      Mental Status: He is alert and oriented to person, place, and time. Mental status is at baseline.   Psychiatric:         Mood and Affect: Mood normal.         Behavior: Behavior normal.         Thought Content: Thought content normal.         Judgment: Judgment normal.          Last Recorded Vitals  There were no vitals taken for this visit.    Relevant Results        Results reviewed      Assessment/Plan   Active Problems:  There are no active Hospital Problems.      pAF  MATILDA GA       I spent 30 minutes in the professional and overall care of this patient.      Torres Glaser MD

## 2024-02-02 NOTE — ANESTHESIA POSTPROCEDURE EVALUATION
Patient: Fabricio Bajwa    Procedure Summary       Date: 02/02/24 Room / Location: [facility] STEREO / Virtual [facility] MAT 3529 Cardiac Cath Lab    Anesthesia Start: 1213 Anesthesia Stop: 1636    Procedure: Ablation A-Fib Paroxysmal Diagnosis:       Paroxysmal atrial fibrillation (CMS/HCC)      (Paroxysmal atrial fibrillation (CMS/HCC) [I48.0])    Providers: Nikos Wong MD Responsible Provider: SUZI Hawley    Anesthesia Type: general ASA Status: 3            Anesthesia Type: general    Vitals Value Taken Time   BP  02/02/24 1714   Temp  02/02/24 1714   Pulse  02/02/24 1714   Resp  02/02/24 1714   SpO2  02/02/24 1714       Anesthesia Post Evaluation    Patient participation: complete - patient participated  Level of consciousness: awake and alert  Pain management: adequate  Airway patency: patent  Cardiovascular status: acceptable  Respiratory status: acceptable  Hydration status: acceptable  Postoperative Nausea and Vomiting: none  Comments: Patient was evaluated in the EP lab post extubation.      There were no known notable events for this encounter.

## 2024-02-02 NOTE — ANESTHESIA PROCEDURE NOTES
Airway  Date/Time: 2/2/2024 12:54 PM  Urgency: elective    Airway not difficult    Staffing  Performed: INGE   Authorized by: Jayson Gonzalez RN    Performed by: Jayson Gonzalez RN  Patient location during procedure: OR    Indications and Patient Condition  Indications for airway management: anesthesia and airway protection  Spontaneous Ventilation: absent  Sedation level: deep  Preoxygenated: yes  Patient position: sniffing  Mask difficulty assessment: 1 - vent by mask  Planned trial extubation    Final Airway Details  Final airway type: endotracheal airway      Successful airway: ETT  Cuffed: yes   Successful intubation technique: direct laryngoscopy  Facilitating devices/methods: intubating stylet  Endotracheal tube insertion site: oral  Blade: Jose Elias  Blade size: #4  Cormack-Lehane Classification: grade IIa - partial view of glottis  Placement verified by: chest auscultation and capnometry   Measured from: gums  ETT to gums (cm): 22  Number of attempts at approach: 2  Ventilation between attempts: BVM

## 2024-02-02 NOTE — ANESTHESIA PREPROCEDURE EVALUATION
Patient: Fabricio Bajwa    Procedure Information       Anesthesia Start Date/Time: 02/02/24 1213    Procedure: Ablation A-Fib Paroxysmal - 11516- RFA AF EPS 3D W/ CARTO GA WHOLE CASE HOLD ELIQUIS THE DAY BEFORE    Location: [facility] [place] [facility] STEREO / Virtual [facility] MAT 3528 Cardiac Cath Lab    Providers: Nikos Wong MD            Relevant Problems   Cardiovascular   (+) Coronary artery disease involving native coronary artery   (+) Intermittent atrial fibrillation (CMS/HCC)   (+) Primary hypertension       Clinical information reviewed:    Allergies                NPO Detail:  NPO/Void Status  Date of Last Liquid: 02/02/24  Time of Last Liquid: 0000  Date of Last Solid: 02/02/24  Time of Last Solid: 0000         Physical Exam    Airway  Mallampati: II  TM distance: >3 FB  Neck ROM: limited     Cardiovascular   Rhythm: irregular     Dental   (+) implants     Pulmonary   Breath sounds clear to auscultation     Abdominal            Anesthesia Plan    History of general anesthesia?: yes  History of complications of general anesthesia?: no    ASA 3     general     The patient is not a current smoker.    intravenous induction   Anesthetic plan and risks discussed with patient.  Use of blood products discussed with patient who.    Plan discussed with attending.

## 2024-02-07 DIAGNOSIS — I10 PRIMARY HYPERTENSION: ICD-10-CM

## 2024-02-07 RX ORDER — METOPROLOL TARTRATE 50 MG/1
50 TABLET ORAL 2 TIMES DAILY
Qty: 180 TABLET | Refills: 3 | Status: SHIPPED | OUTPATIENT
Start: 2024-02-07 | End: 2024-02-21 | Stop reason: SDUPTHER

## 2024-02-21 DIAGNOSIS — I10 PRIMARY HYPERTENSION: ICD-10-CM

## 2024-02-21 RX ORDER — METOPROLOL TARTRATE 50 MG/1
50 TABLET ORAL 2 TIMES DAILY
Qty: 180 TABLET | Refills: 3 | Status: SHIPPED | OUTPATIENT
Start: 2024-02-21 | End: 2024-04-24 | Stop reason: ALTCHOICE

## 2024-02-22 ENCOUNTER — CLINICAL SUPPORT (OUTPATIENT)
Dept: PRIMARY CARE | Facility: CLINIC | Age: 74
End: 2024-02-22
Payer: MEDICARE

## 2024-02-22 DIAGNOSIS — I10 PRIMARY HYPERTENSION: ICD-10-CM

## 2024-02-22 LAB
ALBUMIN SERPL BCP-MCNC: 4.6 G/DL (ref 3.4–5)
ALP SERPL-CCNC: 68 U/L (ref 33–136)
ALT SERPL W P-5'-P-CCNC: 18 U/L (ref 10–52)
ANION GAP SERPL CALC-SCNC: 13 MMOL/L (ref 10–20)
AST SERPL W P-5'-P-CCNC: 25 U/L (ref 9–39)
BILIRUB SERPL-MCNC: 0.6 MG/DL (ref 0–1.2)
BUN SERPL-MCNC: 14 MG/DL (ref 6–23)
CALCIUM SERPL-MCNC: 9.4 MG/DL (ref 8.6–10.3)
CHLORIDE SERPL-SCNC: 100 MMOL/L (ref 98–107)
CHOLEST SERPL-MCNC: 210 MG/DL (ref 0–199)
CHOLESTEROL/HDL RATIO: 3.6
CO2 SERPL-SCNC: 29 MMOL/L (ref 21–32)
CREAT SERPL-MCNC: 0.91 MG/DL (ref 0.5–1.3)
EGFRCR SERPLBLD CKD-EPI 2021: 90 ML/MIN/1.73M*2
GLUCOSE SERPL-MCNC: 94 MG/DL (ref 74–99)
HDLC SERPL-MCNC: 58.8 MG/DL
LDLC SERPL CALC-MCNC: 130 MG/DL
NON HDL CHOLESTEROL: 151 MG/DL (ref 0–149)
POTASSIUM SERPL-SCNC: 3.9 MMOL/L (ref 3.5–5.3)
PROT SERPL-MCNC: 7.2 G/DL (ref 6.4–8.2)
SODIUM SERPL-SCNC: 138 MMOL/L (ref 136–145)
TRIGL SERPL-MCNC: 108 MG/DL (ref 0–149)
VLDL: 22 MG/DL (ref 0–40)

## 2024-02-22 PROCEDURE — 80053 COMPREHEN METABOLIC PANEL: CPT

## 2024-02-22 PROCEDURE — 36415 COLL VENOUS BLD VENIPUNCTURE: CPT

## 2024-02-22 PROCEDURE — 80061 LIPID PANEL: CPT

## 2024-02-28 LAB
ACT BLD: 309 SEC (ref 96–152)
ACT BLD: 328 SEC (ref 96–152)
ACT BLD: 331 SEC (ref 96–152)

## 2024-03-01 ENCOUNTER — OFFICE VISIT (OUTPATIENT)
Dept: PRIMARY CARE | Facility: CLINIC | Age: 74
End: 2024-03-01
Payer: MEDICARE

## 2024-03-01 VITALS
HEART RATE: 84 BPM | SYSTOLIC BLOOD PRESSURE: 118 MMHG | BODY MASS INDEX: 30.66 KG/M2 | WEIGHT: 207 LBS | OXYGEN SATURATION: 97 % | TEMPERATURE: 97 F | HEIGHT: 69 IN | DIASTOLIC BLOOD PRESSURE: 74 MMHG

## 2024-03-01 DIAGNOSIS — Z00.00 ROUTINE GENERAL MEDICAL EXAMINATION AT HEALTH CARE FACILITY: Primary | ICD-10-CM

## 2024-03-01 DIAGNOSIS — I10 PRIMARY HYPERTENSION: ICD-10-CM

## 2024-03-01 DIAGNOSIS — E78.2 MIXED HYPERLIPIDEMIA: ICD-10-CM

## 2024-03-01 PROCEDURE — 3074F SYST BP LT 130 MM HG: CPT | Performed by: FAMILY MEDICINE

## 2024-03-01 PROCEDURE — 1170F FXNL STATUS ASSESSED: CPT | Performed by: FAMILY MEDICINE

## 2024-03-01 PROCEDURE — 1036F TOBACCO NON-USER: CPT | Performed by: FAMILY MEDICINE

## 2024-03-01 PROCEDURE — 1124F ACP DISCUSS-NO DSCNMKR DOCD: CPT | Performed by: FAMILY MEDICINE

## 2024-03-01 PROCEDURE — G0439 PPPS, SUBSEQ VISIT: HCPCS | Performed by: FAMILY MEDICINE

## 2024-03-01 PROCEDURE — 1159F MED LIST DOCD IN RCRD: CPT | Performed by: FAMILY MEDICINE

## 2024-03-01 PROCEDURE — 3078F DIAST BP <80 MM HG: CPT | Performed by: FAMILY MEDICINE

## 2024-03-01 ASSESSMENT — ENCOUNTER SYMPTOMS
HEADACHES: 0
DIARRHEA: 0
UNEXPECTED WEIGHT CHANGE: 0
NUMBNESS: 0
FLANK PAIN: 0
NERVOUS/ANXIOUS: 0
FEVER: 0
ARTHRALGIAS: 0
OCCASIONAL FEELINGS OF UNSTEADINESS: 0
VOMITING: 0
SORE THROAT: 0
CONSTIPATION: 0
APPETITE CHANGE: 0
COUGH: 0
WHEEZING: 0
JOINT SWELLING: 0
ABDOMINAL PAIN: 0
LIGHT-HEADEDNESS: 0
HEMATURIA: 0
EYE ITCHING: 0
DYSPHORIC MOOD: 0
WEAKNESS: 0
EYE DISCHARGE: 0
RHINORRHEA: 0
PALPITATIONS: 0
DIZZINESS: 0
SHORTNESS OF BREATH: 0
SLEEP DISTURBANCE: 0
MYALGIAS: 0
DYSURIA: 0
BLOOD IN STOOL: 0
ACTIVITY CHANGE: 0
LOSS OF SENSATION IN FEET: 0
NAUSEA: 0
SINUS PRESSURE: 0
DEPRESSION: 0

## 2024-03-01 ASSESSMENT — ACTIVITIES OF DAILY LIVING (ADL)
TAKING_MEDICATION: INDEPENDENT
BATHING: INDEPENDENT
DRESSING: INDEPENDENT
MANAGING_FINANCES: INDEPENDENT
DOING_HOUSEWORK: INDEPENDENT
GROCERY_SHOPPING: INDEPENDENT

## 2024-03-01 NOTE — PROGRESS NOTES
"Subjective   Reason for Visit: Fabricio Bajwa is an 73 y.o. male here for a Medicare Wellness visit.     Past Medical, Surgical, and Family History reviewed and updated in chart.    Reviewed all medications by prescribing practitioner or clinical pharmacist (such as prescriptions, OTCs, herbal therapies and supplements) and documented in the medical record.    HPI PATIENT FEELS WELL MOST DAYS   STABLE HEALTH CARE ISSUES INCLUDE BLOOD PRESSURE AND GERD   A FIB WITH RECENT ABLATION AND A FIB IS CONTROLLED     Patient Care Team:  Elsa Finnegan DO as PCP - General (Family Medicine)     Review of Systems   Constitutional:  Negative for activity change, appetite change, fever and unexpected weight change.   HENT:  Negative for congestion, ear pain, postnasal drip, rhinorrhea, sinus pressure and sore throat.    Eyes:  Negative for discharge, itching and visual disturbance.   Respiratory:  Negative for cough, shortness of breath and wheezing.    Cardiovascular:  Negative for chest pain, palpitations and leg swelling.        HAD ABLATION  AFIB NOW CONTROLLED    Gastrointestinal:  Negative for abdominal pain, blood in stool, constipation, diarrhea, nausea and vomiting.   Endocrine: Negative for cold intolerance, heat intolerance and polyuria.   Genitourinary:  Negative for dysuria, flank pain and hematuria.   Musculoskeletal:  Negative for arthralgias, gait problem, joint swelling and myalgias.   Skin:  Negative for rash.   Allergic/Immunologic: Negative for environmental allergies and food allergies.   Neurological:  Negative for dizziness, syncope, weakness, light-headedness, numbness and headaches.   Psychiatric/Behavioral:  Negative for dysphoric mood and sleep disturbance. The patient is not nervous/anxious.        Objective   Vitals:  /74   Pulse 84   Temp 36.1 °C (97 °F)   Ht 1.74 m (5' 8.5\")   Wt 93.9 kg (207 lb)   SpO2 97%   BMI 31.02 kg/m²       Physical Exam  Vitals and nursing note reviewed. "   Constitutional:       Appearance: Normal appearance.   HENT:      Head: Normocephalic.      Mouth/Throat:      Mouth: Mucous membranes are moist.   Cardiovascular:      Rate and Rhythm: Normal rate and regular rhythm.      Pulses: Normal pulses.      Heart sounds: Normal heart sounds. No murmur heard.     No friction rub. No gallop.   Pulmonary:      Effort: Pulmonary effort is normal. No respiratory distress.      Breath sounds: Normal breath sounds. No wheezing.   Abdominal:      General: Bowel sounds are normal. There is no distension.      Palpations: Abdomen is soft.      Tenderness: There is no abdominal tenderness.   Musculoskeletal:         General: No deformity. Normal range of motion.   Skin:     General: Skin is warm and dry.      Capillary Refill: Capillary refill takes less than 2 seconds.   Neurological:      General: No focal deficit present.      Mental Status: He is alert and oriented to person, place, and time.   Psychiatric:         Mood and Affect: Mood normal.         Assessment/Plan   Problem List Items Addressed This Visit       Routine general medical examination at health care facility - Primary

## 2024-03-06 ENCOUNTER — OFFICE VISIT (OUTPATIENT)
Dept: CARDIOLOGY | Facility: CLINIC | Age: 74
End: 2024-03-06
Payer: MEDICARE

## 2024-03-06 VITALS
HEIGHT: 69 IN | WEIGHT: 214.3 LBS | RESPIRATION RATE: 16 BRPM | OXYGEN SATURATION: 98 % | DIASTOLIC BLOOD PRESSURE: 77 MMHG | HEART RATE: 68 BPM | BODY MASS INDEX: 31.74 KG/M2 | SYSTOLIC BLOOD PRESSURE: 124 MMHG

## 2024-03-06 DIAGNOSIS — I48.0 INTERMITTENT ATRIAL FIBRILLATION (MULTI): Primary | ICD-10-CM

## 2024-03-06 DIAGNOSIS — Z95.810 ICD (IMPLANTABLE CARDIOVERTER-DEFIBRILLATOR) IN PLACE: ICD-10-CM

## 2024-03-06 LAB
ATRIAL RATE: 65 BPM
P AXIS: 58 DEGREES
P OFFSET: 181 MS
P ONSET: 118 MS
PR INTERVAL: 188 MS
Q ONSET: 212 MS
QRS COUNT: 11 BEATS
QRS DURATION: 114 MS
QT INTERVAL: 438 MS
QTC CALCULATION(BAZETT): 455 MS
QTC FREDERICIA: 449 MS
R AXIS: -40 DEGREES
T AXIS: 62 DEGREES
T OFFSET: 431 MS
VENTRICULAR RATE: 65 BPM

## 2024-03-06 PROCEDURE — 99214 OFFICE O/P EST MOD 30 MIN: CPT | Performed by: NURSE PRACTITIONER

## 2024-03-06 PROCEDURE — 3074F SYST BP LT 130 MM HG: CPT | Performed by: NURSE PRACTITIONER

## 2024-03-06 PROCEDURE — 3078F DIAST BP <80 MM HG: CPT | Performed by: NURSE PRACTITIONER

## 2024-03-06 PROCEDURE — 93005 ELECTROCARDIOGRAM TRACING: CPT | Performed by: NURSE PRACTITIONER

## 2024-03-06 PROCEDURE — 1036F TOBACCO NON-USER: CPT | Performed by: NURSE PRACTITIONER

## 2024-03-06 PROCEDURE — 1160F RVW MEDS BY RX/DR IN RCRD: CPT | Performed by: NURSE PRACTITIONER

## 2024-03-06 PROCEDURE — 1159F MED LIST DOCD IN RCRD: CPT | Performed by: NURSE PRACTITIONER

## 2024-03-06 ASSESSMENT — ENCOUNTER SYMPTOMS
DIAPHORESIS: 0
PND: 0
DIARRHEA: 0
NAUSEA: 0
DOUBLE VISION: 0
VOMITING: 0
WEAKNESS: 0
FALLS: 0
MYALGIAS: 0
FEVER: 0
ORTHOPNEA: 0
SNORING: 0
LIGHT-HEADEDNESS: 0
SYNCOPE: 0
SPUTUM PRODUCTION: 0
DYSPNEA ON EXERTION: 0
BLURRED VISION: 0
SORE THROAT: 0
COUGH: 0
HEADACHES: 0
SHORTNESS OF BREATH: 0
PALPITATIONS: 0
ABDOMINAL PAIN: 0
IRREGULAR HEARTBEAT: 0
HEMOPTYSIS: 0
NEAR-SYNCOPE: 0
DIZZINESS: 0

## 2024-03-06 ASSESSMENT — PATIENT HEALTH QUESTIONNAIRE - PHQ9
2. FEELING DOWN, DEPRESSED OR HOPELESS: NOT AT ALL
SUM OF ALL RESPONSES TO PHQ9 QUESTIONS 1 AND 2: 0
1. LITTLE INTEREST OR PLEASURE IN DOING THINGS: NOT AT ALL

## 2024-03-06 NOTE — PROGRESS NOTES
Subjective   Fabricio Bajwa is a 73 y.o. male.    Chief Complaint:  Follow-up    Fabricio Bajwa is a 73 y.o. with:    1.Hx of MI / CAD - s/p PCI to LCX (2014) Xience 2/5/2028 DONTAE, at [facility],   2. ICM  - s/p CD defibrillator Medtronic, implanted (Mar 2014).  Serial number BB [id number], model number DD BB 1 D4 by Dr. Wilmer Patel.  3. Paroxysmal AF      Oct 2022:  AT / AF plus SVT noted on Dual-chamber ICD check      April 2023: Inappropriate AICD shock -according to [facility] cardiology note, was due to AF with RVR entering the VF zone and reset patient into NSR.   Metoprolol increased to 50 mg BID (from 25 mg BID) and reprogrammed VF zone to 194 bpm and f/u visit in July noted device interrogation STATUS:   Battery: The battery shows MOL depletion, estimate 2.7 years to PEDRO.   Lead(s): Lead impedence, sensing, thresholds are reported satisfactory.   Therapies: None   Atrial arrhythmias: AT/AF 2 events, longest 39 sec, burden < 0.1%   VHR/VT/VF: 629 NSVT      TESTING:      -Nuclear Stress:  (May 2022) Mild (>10%) ischemia in LCx.  Large (>20%) fixed perfusion defect in LCx.   LV mildly dilated and systolic function mildly decreased.  LVEF 45%.    -[facility]: (Oct 2022) Native Coronary Artery Disease in the LCX (Patent stent in Mid Lcx, 20% mild tubular stenosis proximal to the stent.) and Mild LV dysfunction EF 45%  -TTE:  (May 2021) LVEF 53 +/- 5%.  Mild LVH.  Suboptimal image quality noted.     Now s/p AVNRT and Afib RFA with Dr. Wong 2/2/2024  ECG 3/5/24 NSR HR 65 bpm    TODAY patient presents for 1 month follow-up post A-fib and AVNRT ablation.  He is doing well and denies any recurrence of his arrhythmia.  He has not had a device interrogation since the ablation.  He denies any chest pain, shortness of breath, orthopnea, lightheadedness, syncope and lower extremity edema.  He denies any bleeding or pain at the right groin site.    /77 (BP Location: Left arm)   Pulse 68   Resp 16   Ht  "1.753 m (5' 9\")   Wt 97.2 kg (214 lb 4.8 oz)   SpO2 98%   BMI 31.65 kg/m²   Current Outpatient Medications on File Prior to Visit   Medication Sig Dispense Refill    aspirin 81 mg EC tablet Take 1 tablet (81 mg) by mouth once daily.      Eliquis 5 mg tablet Take 1 tablet (5 mg) by mouth twice a day.      metoprolol tartrate (Lopressor) 50 mg tablet Take 1 tablet by mouth 2 times a day. 180 tablet 3    multivitamin capsule Take 1 capsule by mouth once daily.      sildenafil (Viagra) 100 mg tablet Take 1 tablet (100 mg) by mouth if needed for erectile dysfunction. 10 tablet 11    [DISCONTINUED] lisinopril 5 mg tablet Take 1 tablet (5 mg) by mouth once daily.      [DISCONTINUED] pantoprazole (ProtoNix) 40 mg EC tablet Take 1 tablet (40 mg) by mouth 2 times a day. Do not crush, chew, or split. 60 tablet 0     No current facility-administered medications on file prior to visit.         Review of Systems   Constitutional: Negative for diaphoresis, fever and malaise/fatigue.   HENT:  Negative for congestion and sore throat.    Eyes:  Negative for blurred vision and double vision.   Cardiovascular:  Negative for chest pain, dyspnea on exertion, irregular heartbeat, leg swelling, near-syncope, orthopnea, palpitations, paroxysmal nocturnal dyspnea and syncope.   Respiratory:  Negative for cough, hemoptysis, shortness of breath, snoring and sputum production.    Hematologic/Lymphatic: Negative for bleeding problem.   Skin:  Negative for rash.   Musculoskeletal:  Negative for falls, joint pain and myalgias.   Gastrointestinal:  Negative for abdominal pain, diarrhea, nausea and vomiting.   Neurological:  Negative for dizziness, headaches, light-headedness and weakness.   All other systems reviewed and are negative.      Objective   Constitutional:       Appearance: Healthy appearance. Not in distress.   Eyes:      Conjunctiva/sclera: Conjunctivae normal.   HENT:      Nose: Nose normal.    Mouth/Throat:      Pharynx: " Oropharynx is clear.   Neck:      Vascular: No JVR. JVD normal.   Pulmonary:      Effort: Pulmonary effort is normal.      Breath sounds: Normal breath sounds. No wheezing. No rhonchi.   Chest:      Chest wall: Not tender to palpatation.   Cardiovascular:      Normal rate. Regular rhythm.      Murmurs: There is no murmur.      No rub.   Pulses:     Intact distal pulses.   Edema:     Peripheral edema absent.   Abdominal:      General: Bowel sounds are normal.      Palpations: Abdomen is soft.   Musculoskeletal: Normal range of motion.      Cervical back: Neck supple. Skin:     General: Skin is warm and dry.      Comments: Right groin site well approximated, no bruising/bleeding/swelling/redness/pain   Neurological:      Mental Status: Alert and oriented to person, place and time.      Motor: Motor function is intact.         Lab Review:   Lab Results   Component Value Date     02/22/2024    K 3.9 02/22/2024     02/22/2024    CO2 29 02/22/2024    BUN 14 02/22/2024    CREATININE 0.91 02/22/2024    GLUCOSE 94 02/22/2024    CALCIUM 9.4 02/22/2024     Lab Results   Component Value Date    WBC 6.8 01/24/2024    HGB 14.8 01/24/2024    HCT 45.3 01/24/2024    MCV 98 01/24/2024     01/24/2024       Assessment/Plan   The primary encounter diagnosis was Intermittent atrial fibrillation (CMS/HCC). A diagnosis of ICD (implantable cardioverter-defibrillator) in place was also pertinent to this visit.  Patient is maintaining normal sinus rhythm 1 month post ablation.  He denies any symptoms or complications from the procedure.  We discussed normal symptoms that can occur within the first 3 months postprocedure.  We also discussed when he should come for follow-up in the office or go to the emergency room.  Patient expressed understanding, all questions asked and answered  Patient's goal is to get off all of his medications eventually.  We will not stop any medicines today, but could potentially come off his  metoprolol 3 months postprocedure.  Patient asked if he could have his ICD removed eventually or have it turned off.  He is still considering this option although I do not encourage him to do this.    Continue current medications  Follow-up with me in 2 months along with a device check  Patient can follow-up sooner as needed

## 2024-04-24 ENCOUNTER — OFFICE VISIT (OUTPATIENT)
Dept: CARDIOLOGY | Facility: CLINIC | Age: 74
End: 2024-04-24
Payer: MEDICARE

## 2024-04-24 ENCOUNTER — HOSPITAL ENCOUNTER (OUTPATIENT)
Dept: CARDIOLOGY | Facility: CLINIC | Age: 74
Discharge: HOME | End: 2024-04-24
Payer: MEDICARE

## 2024-04-24 VITALS
SYSTOLIC BLOOD PRESSURE: 121 MMHG | OXYGEN SATURATION: 98 % | WEIGHT: 229.4 LBS | RESPIRATION RATE: 16 BRPM | HEART RATE: 63 BPM | DIASTOLIC BLOOD PRESSURE: 80 MMHG | HEIGHT: 69 IN | BODY MASS INDEX: 33.98 KG/M2

## 2024-04-24 DIAGNOSIS — I48.0 INTERMITTENT ATRIAL FIBRILLATION (MULTI): ICD-10-CM

## 2024-04-24 DIAGNOSIS — Z95.810 ICD (IMPLANTABLE CARDIOVERTER-DEFIBRILLATOR) IN PLACE: ICD-10-CM

## 2024-04-24 LAB
ATRIAL RATE: 60 BPM
P AXIS: 52 DEGREES
P OFFSET: 173 MS
P ONSET: 120 MS
PR INTERVAL: 186 MS
Q ONSET: 213 MS
QRS COUNT: 10 BEATS
QRS DURATION: 106 MS
QT INTERVAL: 428 MS
QTC CALCULATION(BAZETT): 428 MS
QTC FREDERICIA: 428 MS
R AXIS: -37 DEGREES
T AXIS: 41 DEGREES
T OFFSET: 427 MS
VENTRICULAR RATE: 60 BPM

## 2024-04-24 PROCEDURE — 99214 OFFICE O/P EST MOD 30 MIN: CPT | Performed by: NURSE PRACTITIONER

## 2024-04-24 PROCEDURE — 3074F SYST BP LT 130 MM HG: CPT | Performed by: NURSE PRACTITIONER

## 2024-04-24 PROCEDURE — 93005 ELECTROCARDIOGRAM TRACING: CPT | Performed by: NURSE PRACTITIONER

## 2024-04-24 PROCEDURE — 1036F TOBACCO NON-USER: CPT | Performed by: NURSE PRACTITIONER

## 2024-04-24 PROCEDURE — 1160F RVW MEDS BY RX/DR IN RCRD: CPT | Performed by: NURSE PRACTITIONER

## 2024-04-24 PROCEDURE — 3079F DIAST BP 80-89 MM HG: CPT | Performed by: NURSE PRACTITIONER

## 2024-04-24 PROCEDURE — 1159F MED LIST DOCD IN RCRD: CPT | Performed by: NURSE PRACTITIONER

## 2024-04-24 ASSESSMENT — ENCOUNTER SYMPTOMS
DOUBLE VISION: 0
WEAKNESS: 0
VOMITING: 0
SYNCOPE: 0
NEAR-SYNCOPE: 0
IRREGULAR HEARTBEAT: 0
MYALGIAS: 0
SNORING: 0
SORE THROAT: 0
ABDOMINAL PAIN: 0
SPUTUM PRODUCTION: 0
COUGH: 0
PND: 0
DIZZINESS: 0
HEMOPTYSIS: 0
PALPITATIONS: 0
ORTHOPNEA: 0
HEADACHES: 0
FEVER: 0
NAUSEA: 0
SHORTNESS OF BREATH: 0
DYSPNEA ON EXERTION: 0
FALLS: 0
DIARRHEA: 0
LIGHT-HEADEDNESS: 0
BLURRED VISION: 0
DIAPHORESIS: 0

## 2024-04-24 ASSESSMENT — PATIENT HEALTH QUESTIONNAIRE - PHQ9
2. FEELING DOWN, DEPRESSED OR HOPELESS: NOT AT ALL
1. LITTLE INTEREST OR PLEASURE IN DOING THINGS: NOT AT ALL
SUM OF ALL RESPONSES TO PHQ9 QUESTIONS 1 AND 2: 0

## 2024-04-24 NOTE — PROGRESS NOTES
"Subjective   Fabricio Bajwa is a 73 y.o. male.    Chief Complaint:  Follow-up    Fabricio Bajwa is a 73 y.o. with:    1.Hx of MI / CAD - s/p PCI to LCX (2014) Xience 2/5/2028 DONTAE, at [facility],   2. ICM  - s/p CD defibrillator Medtronic, implanted (Mar 2014).  Serial number BB [id number], model number DD BB 1 D4 by Dr. Wilmer Patel.  3. Paroxysmal AF      Oct 2022:  AT / AF plus SVT noted on Dual-chamber ICD check      April 2023: Inappropriate AICD shock -according to [facility] cardiology note, was due to AF with RVR entering the VF zone and reset patient into NSR.   Metoprolol increased to 50 mg BID (from 25 mg BID) and reprogrammed VF zone to 194 bpm and f/u visit in July noted device interrogation STATUS:   Battery: The battery shows MOL depletion, estimate 2.7 years to PEDRO.   Lead(s): Lead impedence, sensing, thresholds are reported satisfactory.   Therapies: None   Atrial arrhythmias: AT/AF 2 events, longest 39 sec, burden < 0.1%   VHR/VT/VF: 629 NSVT      TESTING:      -Nuclear Stress:  (May 2022) Mild (>10%) ischemia in LCx.  Large (>20%) fixed perfusion defect in LCx.   LV mildly dilated and systolic function mildly decreased.  LVEF 45%.    -[facility]: (Oct 2022) Native Coronary Artery Disease in the LCX (Patent stent in Mid Lcx, 20% mild tubular stenosis proximal to the stent.) and Mild LV dysfunction EF 45%  -TTE:  (May 2021) LVEF 53 +/- 5%.  Mild LVH.  Suboptimal image quality noted.     Now s/p AVNRT and Afib RFA with Dr. Wong 2/2/2024  ECG 3/5/24 NSR HR 65 bpm  Device check 4/24: Last episode of Afib 3/12/24, less than 3 minutes  ECG 4/24/2024 NSR HR 60 bpm    TODAY Patient presents for 3 month follow up post ablation and is doing well.  His device check today showed minimal Afib, none in the last 6 weeks.  Patient denies an cardiac symptoms.  His goal is to get off all medications and is wondering if he can stop any today.    /80   Pulse 63   Resp 16   Ht 1.753 m (5' 9\")   Wt 104 " kg (229 lb 6.4 oz)   SpO2 98%   BMI 33.88 kg/m²   Current Outpatient Medications on File Prior to Visit   Medication Sig Dispense Refill    aspirin 81 mg EC tablet Take 1 tablet (81 mg) by mouth once daily.      Eliquis 5 mg tablet Take 1 tablet (5 mg) by mouth twice a day.      multivitamin capsule Take 1 capsule by mouth once daily.      sildenafil (Viagra) 100 mg tablet Take 1 tablet (100 mg) by mouth if needed for erectile dysfunction. 10 tablet 11    [DISCONTINUED] metoprolol tartrate (Lopressor) 50 mg tablet Take 1 tablet by mouth 2 times a day. 180 tablet 3     No current facility-administered medications on file prior to visit.         Review of Systems   Constitutional: Negative for diaphoresis, fever and malaise/fatigue.   HENT:  Negative for congestion and sore throat.    Eyes:  Negative for blurred vision and double vision.   Cardiovascular:  Negative for chest pain, dyspnea on exertion, irregular heartbeat, leg swelling, near-syncope, orthopnea, palpitations, paroxysmal nocturnal dyspnea and syncope.   Respiratory:  Negative for cough, hemoptysis, shortness of breath, snoring and sputum production.    Hematologic/Lymphatic: Negative for bleeding problem.   Skin:  Negative for rash.   Musculoskeletal:  Negative for falls, joint pain and myalgias.   Gastrointestinal:  Negative for abdominal pain, diarrhea, nausea and vomiting.   Neurological:  Negative for dizziness, headaches, light-headedness and weakness.   All other systems reviewed and are negative.      Objective   Constitutional:       Appearance: Healthy appearance. Not in distress.   Eyes:      Conjunctiva/sclera: Conjunctivae normal.   HENT:      Nose: Nose normal.    Mouth/Throat:      Pharynx: Oropharynx is clear.   Neck:      Vascular: No JVR. JVD normal.   Pulmonary:      Effort: Pulmonary effort is normal.      Breath sounds: Normal breath sounds. No wheezing. No rhonchi.   Chest:      Chest wall: Not tender to palpatation.    Cardiovascular:      Normal rate. Regular rhythm.      Murmurs: There is no murmur.      No rub.   Pulses:     Intact distal pulses.   Edema:     Peripheral edema absent.   Abdominal:      General: Bowel sounds are normal.      Palpations: Abdomen is soft.   Musculoskeletal: Normal range of motion.      Cervical back: Neck supple. Skin:     General: Skin is warm and dry.   Neurological:      Mental Status: Alert and oriented to person, place and time.      Motor: Motor function is intact.         Lab Review:   Lab Results   Component Value Date     02/22/2024    K 3.9 02/22/2024     02/22/2024    CO2 29 02/22/2024    BUN 14 02/22/2024    CREATININE 0.91 02/22/2024    GLUCOSE 94 02/22/2024    CALCIUM 9.4 02/22/2024     Lab Results   Component Value Date    WBC 6.8 01/24/2024    HGB 14.8 01/24/2024    HCT 45.3 01/24/2024    MCV 98 01/24/2024     01/24/2024       Assessment/Plan   The encounter diagnosis was Intermittent atrial fibrillation (Multi).  Patient presents today for 3-month ablation follow-up, he is doing well and has not had any episodes of A-fib in the last 6 weeks.  He would really like to try and come off some of his medications.  Today we will stop metoprolol  His [id number] score is 3, it is unlikely that we will recommend that he come off the Eliquis, however he may choose to do so in the future.  We can discuss possible watchman if that is the case.    Stop metoprolol, follow-up with me in device clinic in 2 months

## 2024-05-05 ENCOUNTER — HOSPITAL ENCOUNTER (EMERGENCY)
Facility: HOSPITAL | Age: 74
Discharge: LEFT BEFORE DISCHARGE | End: 2024-05-05
Attending: FAMILY MEDICINE
Payer: MEDICARE

## 2024-05-05 VITALS
HEART RATE: 64 BPM | OXYGEN SATURATION: 96 % | WEIGHT: 225 LBS | DIASTOLIC BLOOD PRESSURE: 87 MMHG | SYSTOLIC BLOOD PRESSURE: 145 MMHG | TEMPERATURE: 96.5 F | BODY MASS INDEX: 33.33 KG/M2 | HEIGHT: 69 IN | RESPIRATION RATE: 16 BRPM

## 2024-05-05 DIAGNOSIS — Z87.39 HISTORY OF BURSITIS: ICD-10-CM

## 2024-05-05 DIAGNOSIS — G89.29 CHRONIC LEFT SHOULDER PAIN: Primary | ICD-10-CM

## 2024-05-05 DIAGNOSIS — M25.512 CHRONIC LEFT SHOULDER PAIN: Primary | ICD-10-CM

## 2024-05-05 DIAGNOSIS — Z53.20 MEDICATION THERAPY MANAGEMENT RECOMMENDATION DECLINED BY PATIENT: ICD-10-CM

## 2024-05-05 DIAGNOSIS — Z53.29 LEFT AGAINST MEDICAL ADVICE: ICD-10-CM

## 2024-05-05 PROCEDURE — 96372 THER/PROPH/DIAG INJ SC/IM: CPT

## 2024-05-05 PROCEDURE — 2500000004 HC RX 250 GENERAL PHARMACY W/ HCPCS (ALT 636 FOR OP/ED)

## 2024-05-05 PROCEDURE — 99283 EMERGENCY DEPT VISIT LOW MDM: CPT

## 2024-05-05 RX ORDER — TRIAMCINOLONE ACETONIDE 40 MG/ML
INJECTION, SUSPENSION INTRA-ARTICULAR; INTRAMUSCULAR
Status: COMPLETED
Start: 2024-05-05 | End: 2024-05-05

## 2024-05-05 RX ORDER — TRIAMCINOLONE ACETONIDE 40 MG/ML
40 INJECTION, SUSPENSION INTRA-ARTICULAR; INTRAMUSCULAR ONCE
Status: COMPLETED | OUTPATIENT
Start: 2024-05-05 | End: 2024-05-05

## 2024-05-05 RX ADMIN — TRIAMCINOLONE ACETONIDE 40 MG: 40 INJECTION, SUSPENSION INTRA-ARTICULAR; INTRAMUSCULAR at 12:29

## 2024-05-05 ASSESSMENT — PAIN - FUNCTIONAL ASSESSMENT: PAIN_FUNCTIONAL_ASSESSMENT: 0-10

## 2024-05-05 ASSESSMENT — PAIN DESCRIPTION - LOCATION: LOCATION: SHOULDER

## 2024-05-05 ASSESSMENT — COLUMBIA-SUICIDE SEVERITY RATING SCALE - C-SSRS
1. IN THE PAST MONTH, HAVE YOU WISHED YOU WERE DEAD OR WISHED YOU COULD GO TO SLEEP AND NOT WAKE UP?: NO
6. HAVE YOU EVER DONE ANYTHING, STARTED TO DO ANYTHING, OR PREPARED TO DO ANYTHING TO END YOUR LIFE?: NO
2. HAVE YOU ACTUALLY HAD ANY THOUGHTS OF KILLING YOURSELF?: NO

## 2024-05-05 ASSESSMENT — PAIN SCALES - GENERAL: PAINLEVEL_OUTOF10: 10 - WORST POSSIBLE PAIN

## 2024-05-05 NOTE — ED PROVIDER NOTES
HPI   Chief Complaint   Patient presents with    Shoulder Pain     Left shoulder pain for the past 3-4 days getting worse, unable to sleep due to the pain       HPI     Patient came to the emergency room with complaint of shoulder pain for last 3 to 4 days.  Patient says that he has history of chronic bursitis recurrent episodes has been treated with IM cortisone shots his primary care physician and previous physician, his previous physician has retired and his current primary care physician.  Will not be able to see him until he comes back from vacation and going to [place] will occasionally want to have a cortisone shot as he has chronic arthritis from an bursitis left shoulder.  Denies any new trauma fall injury he refused to have any cardiac imaging study done a workup done.  He denies any contraindication for steroid shot denies history of peptic ulcers or renal problem or diabetes.  Patient denies trauma fall injury.  Denies history of malignancy or metastatic disease.  Patient has history of coronary artery disease history of atrial fibrillation congestive heart failure however she denies any chest pain or short of breath dizzy or lightheaded or palpitation and does not want to be evaluated for anything even imaging studies of the shoulder.  He is here strictly for steroid shot.  Which she has used in the past for bursitis without any adverse effects.       Family history: Reviewed  Social history: Reviewed, denies substance abuse.  Review of system: 10 review of system obtained review of systems described in HPI otherwise negative.          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[facility])     Heart disease      Past Surgical History:   Procedure Laterality Date    BACK SURGERY      1-5 decompressed    CARDIAC ELECTROPHYSIOLOGY PROCEDURE N/A 02/02/2024     Procedure: Ablation A-Fib Paroxysmal;  Surgeon: Nikos Wong MD;  Location: [place] [address] Cardiac Cath Lab;  Service: Electrophysiology;  Laterality: N/A;  27357- RFA AF EPS 3D W/ CARTO GA WHOLE CASE HOLD ELIQUIS THE DAY BEFORE    INSERT / REPLACE / REMOVE PACEMAKER      NECK SURGERY      cage with bone graft     Family History   Problem Relation Name Age of Onset    COPD Mother      Alcohol abuse Father Fabricio Bajwa Senior     Heart disease Father Fabricio Bajwa Senior     Kidney disease Sister      Diabetes Sister      COPD Sister      Heart disease Sister Isabel Bajwa     Asthma Sister Juana, Mauricio     No Known Problems Sister      Liver disease Sister      Hepatitis Sister      Hepatitis Brother       Social History     Tobacco Use    Smoking status: Former     Current packs/day: 0.00     Average packs/day: 1.5 packs/day for 55.0 years (82.5 ttl pk-yrs)     Types: Cigarettes     Start date: 1965     Quit date: 2005     Years since quittin.8    Smokeless tobacco: Never   Vaping Use    Vaping status: Never Used   Substance Use Topics    Alcohol use: Not Currently     Comment: quit     Drug use: Not Currently     Types: Marijuana       Physical Exam   ED Triage Vitals [24 1027]   Temperature Heart Rate Respirations BP   35.8 °C (96.5 °F) 64 16 145/87      Pulse Ox Temp Source Heart Rate Source Patient Position   96 % Temporal -- Sitting      BP Location FiO2 (%)     Right arm 96 %       Physical Exam  Constitutional:       Comments: Elderly male patient who was awake alert demanding he wants cortisone shot ago or any imaging study or any other evaluation denies any chest pain or short of breath.  Awake alert oriented x 3 understood risk and benefits well.  No facial drooping or drooling no started no ear nose discharge head was normocephalic atraumatic.  Cervical thoracic lumbar spine nontender he has bilateral shoulder joint stiffness and limitation in range of motion as raising arm  above shoulder bilaterally cause some pain but he was externally internally rotate arm and shoulder good handgrip intact radial pulses cervical thoracic lumbar spine nontender chest wall nontender pelvic is stable.  Patient walking with steady gait.   HENT:      Head: Normocephalic and atraumatic.      Ears:      Comments: Bilateral external ears within normal range no ear nose discharge or bleed patient denies any ear nose or throat complaints.     Mouth/Throat:      Mouth: Mucous membranes are moist.   Eyes:      Extraocular Movements: Extraocular movements intact.      Pupils: Pupils are equal, round, and reactive to light.   Cardiovascular:      Rate and Rhythm: Normal rate and regular rhythm.   Musculoskeletal:      Cervical back: Normal range of motion and neck supple.      Comments: Patient noted to have chronic shoulder problem with the slightest difference when he tried to raise arm above the shoulder area but able to extend internally rotate arm shoulder bilateral flexor plantar pronate supinate forearm elbow good handgrip intact radial pulses cervical thoracic lumbar spine nontender neck is supple lungs are clear no abdominal pelvic  Spine nontender patient markable cervical.  Intact distal pulse intact sensation calf muscle nontender Homans' sign negative.       Skin:     General: Skin is warm and dry.   Neurological:      Comments: Awake alert without acute distress talking breathing comfortably no motor or sensory deficit chronic bursitis related to shoulder pain but able to move extend internally rotated arm and shoulder about raising above shoulder cause pain patient refused x-rays.         ED Course & MDM   Diagnoses as of 05/05/24 1225   Chronic left shoulder pain   History of bursitis   Left against medical advice   Medication therapy management recommendation declined by patient       Medical Decision Making    Patient who is pretty demanding and wanted a cortisone shot does not want any x-ray or  chronic shoulder problem is scalp MSK section he has history of chronic cardiopulmonary problems denies any chest pain or short of breath does not want any cardiac workup or any other evaluation except cortisone shot and wants to leave.  He understood risk and benefit well examination was done as described above some stiffness of the shoulder joint when he tried to raise arm above shoulder otherwise his range of motion shoulder joints with a normal history of good handgrip intact radial pulses there was no bruise edema or tenderness to firm palpation shoulder cervical thoracic lumbar spine neck was supple.  He was breathing comfortably and no tachypnea hypoxemia respite distress noted.  He has underlying cardiopulmonary disease but denies any acute symptoms and refused any workup.  He denies any contraindication steroids.  He was demanding steroid shot as he is going out of town and will not be able to see his physician in a timely fashion is going indication.  Given Kenalog shot after he understood risk and benefit well however he is signing AGAINST MEDICAL ADVICE for refusing workup including imaging studies.  He understood risk and benefit well advised to change his mind return to ER otherwise if any problem concern again return to ER may follow-up with orthopedic surgeon or primary care physician he was referred to orthopedic surgeon on-call if any problem concern when he is out of town he should report to local emergency room immediately.  Patient understood risk and benefit well he was fully awake and alert cardiac cath Kenalog shot and signed AMA.  Procedure  Procedures     Angelina Nunn MD  05/05/24 1230       Angelina Nunn MD  05/05/24 1230

## 2024-05-05 NOTE — DISCHARGE INSTRUCTIONS
You have declined and refused imaging study of workup.  Bone disease metastatic disease cancer dislocation fracture cannot be entirely excluded.  You want to have her steroid shot as you are doing in the past and refused any workup.  You are planning on going out of town location and you are eventually cannot tolerate pain well in the shoulder area has been a chronic shoulder problem and we are electing to give you a shot of steroid.  If any problem concern you need to go to local emergency room.  Follow-up with your primary care physician and orthopedic surgeon.  Also referral to shoulder specialist.

## 2024-05-08 DIAGNOSIS — I48.0 PAROXYSMAL ATRIAL FIBRILLATION (MULTI): ICD-10-CM

## 2024-05-08 RX ORDER — APIXABAN 5 MG/1
5 TABLET, FILM COATED ORAL 2 TIMES DAILY
Qty: 180 TABLET | Refills: 3 | Status: SHIPPED | OUTPATIENT
Start: 2024-05-08 | End: 2024-05-20 | Stop reason: SDUPTHER

## 2024-05-20 DIAGNOSIS — I48.0 PAROXYSMAL ATRIAL FIBRILLATION (MULTI): ICD-10-CM

## 2024-05-20 RX ORDER — APIXABAN 5 MG/1
5 TABLET, FILM COATED ORAL 2 TIMES DAILY
Qty: 180 TABLET | Refills: 3 | Status: SHIPPED | OUTPATIENT
Start: 2024-05-20 | End: 2025-05-20

## 2024-06-26 ENCOUNTER — HOSPITAL ENCOUNTER (OUTPATIENT)
Dept: CARDIOLOGY | Facility: CLINIC | Age: 74
Discharge: HOME | End: 2024-06-26
Payer: MEDICARE

## 2024-06-26 ENCOUNTER — OFFICE VISIT (OUTPATIENT)
Dept: CARDIOLOGY | Facility: CLINIC | Age: 74
End: 2024-06-26
Payer: MEDICARE

## 2024-06-26 VITALS
SYSTOLIC BLOOD PRESSURE: 116 MMHG | RESPIRATION RATE: 16 BRPM | DIASTOLIC BLOOD PRESSURE: 72 MMHG | BODY MASS INDEX: 33.23 KG/M2 | HEART RATE: 77 BPM | HEIGHT: 69 IN | OXYGEN SATURATION: 96 %

## 2024-06-26 DIAGNOSIS — I48.0 PAF (PAROXYSMAL ATRIAL FIBRILLATION) (MULTI): Primary | ICD-10-CM

## 2024-06-26 DIAGNOSIS — I42.9 CARDIOMYOPATHY, UNSPECIFIED TYPE (MULTI): ICD-10-CM

## 2024-06-26 PROCEDURE — 99214 OFFICE O/P EST MOD 30 MIN: CPT | Performed by: NURSE PRACTITIONER

## 2024-06-26 PROCEDURE — 1036F TOBACCO NON-USER: CPT | Performed by: NURSE PRACTITIONER

## 2024-06-26 PROCEDURE — 93283 PRGRMG EVAL IMPLANTABLE DFB: CPT | Performed by: INTERNAL MEDICINE

## 2024-06-26 PROCEDURE — 93005 ELECTROCARDIOGRAM TRACING: CPT | Performed by: NURSE PRACTITIONER

## 2024-06-26 PROCEDURE — 3074F SYST BP LT 130 MM HG: CPT | Performed by: NURSE PRACTITIONER

## 2024-06-26 PROCEDURE — 1159F MED LIST DOCD IN RCRD: CPT | Performed by: NURSE PRACTITIONER

## 2024-06-26 PROCEDURE — 1160F RVW MEDS BY RX/DR IN RCRD: CPT | Performed by: NURSE PRACTITIONER

## 2024-06-26 PROCEDURE — 3078F DIAST BP <80 MM HG: CPT | Performed by: NURSE PRACTITIONER

## 2024-06-26 PROCEDURE — 93283 PRGRMG EVAL IMPLANTABLE DFB: CPT

## 2024-06-26 ASSESSMENT — ENCOUNTER SYMPTOMS
FALLS: 0
DIAPHORESIS: 0
MYALGIAS: 0
HEADACHES: 0
SPUTUM PRODUCTION: 0
SHORTNESS OF BREATH: 0
DIARRHEA: 0
DOUBLE VISION: 0
PALPITATIONS: 0
DYSPNEA ON EXERTION: 0
WEAKNESS: 0
DIZZINESS: 0
ABDOMINAL PAIN: 0
LIGHT-HEADEDNESS: 0
HEMOPTYSIS: 0
SNORING: 0
SORE THROAT: 0
SYNCOPE: 0
IRREGULAR HEARTBEAT: 0
NEAR-SYNCOPE: 0
FEVER: 0
VOMITING: 0
NAUSEA: 0
COUGH: 0
ORTHOPNEA: 0
BLURRED VISION: 0
PND: 0

## 2024-06-26 ASSESSMENT — PATIENT HEALTH QUESTIONNAIRE - PHQ9
1. LITTLE INTEREST OR PLEASURE IN DOING THINGS: NOT AT ALL
SUM OF ALL RESPONSES TO PHQ9 QUESTIONS 1 AND 2: 0
2. FEELING DOWN, DEPRESSED OR HOPELESS: NOT AT ALL

## 2024-06-26 NOTE — PROGRESS NOTES
Subjective   Fabricio Bajwa is a 73 y.o. male.    Chief Complaint:  Follow-up (WITH DEVICE CHECK)    Fabricio Bajwa is a 73 y.o. with:    1.Hx of MI / CAD - s/p PCI to LCX (2014) Xience 2/5/2028 DONTAE, at [facility],   2. ICM  - s/p CD defibrillator Medtronic, implanted (Mar 2014).  Serial number BB [id number], model number DD BB 1 D4 by Dr. Wilmer Patel.  3. Paroxysmal AF      Oct 2022:  AT / AF plus SVT noted on Dual-chamber ICD check      April 2023: Inappropriate AICD shock -according to [facility] cardiology note, was due to AF with RVR entering the VF zone and reset patient into NSR.   Metoprolol increased to 50 mg BID (from 25 mg BID) and reprogrammed VF zone to 194 bpm and f/u visit in July noted device interrogation STATUS:   Battery: The battery shows MOL depletion, estimate 2.7 years to PEDRO.   Lead(s): Lead impedence, sensing, thresholds are reported satisfactory.   Therapies: None   Atrial arrhythmias: AT/AF 2 events, longest 39 sec, burden < 0.1%   VHR/VT/VF: 629 NSVT      TESTING:      -Nuclear Stress:  (May 2022) Mild (>10%) ischemia in LCx.  Large (>20%) fixed perfusion defect in LCx.   LV mildly dilated and systolic function mildly decreased.  LVEF 45%.    -C: (Oct 2022) Native Coronary Artery Disease in the LCX (Patent stent in Mid Lcx, 20% mild tubular stenosis proximal to the stent.) and Mild LV dysfunction EF 45%  -TTE:  (May 2021) LVEF 53 +/- 5%.  Mild LVH.  Suboptimal image quality noted.     Now s/p AVNRT and Afib RFA with Dr. Wong 2/2/2024  ECG 3/5/24 NSR HR 65 bpm  Device check 4/24: Last episode of Afib 3/12/24, less than 3 minutes  ECG 4/24/2024 NSR HR 60 bpm  Device check 6/26/2024: Since last check, he had 1.5 minutes of Afib, there were some episode of SVT with HR in the 130s-140s. Longest episodes was 30 minutes. These mostly occurred 6/11 between 4:45 pm and 5:15 pm and then 6/12 in the morning.  Patient states he was cutting the grass and then mixing and pouring  "concrete during this time, he denies any symptoms  ECG 6/26/2024 NSR HR 69 bpm, left axis deviation    TODAY patient presents for 2-month follow-up, now 4 and half months post ablation.  Overall he is feeling good.  We stopped his metoprolol last time and he denies any episodes of his arrhythmia since then.  Of note, there were some SVT episodes that were concentrated during certain times, and the patient thinks he was actively doing things to make his heart rate go high at that time.  He continues to take Eliquis denies any bleeding.    /72 (BP Location: Right arm)   Pulse 77   Resp 16   Ht 1.753 m (5' 9\")   SpO2 96%   BMI 33.23 kg/m²   Current Outpatient Medications on File Prior to Visit   Medication Sig Dispense Refill    aspirin 81 mg EC tablet Take 1 tablet (81 mg) by mouth once daily.      Eliquis 5 mg tablet Take 1 tablet (5 mg) by mouth 2 times a day. 180 tablet 3    multivitamin capsule Take 1 capsule by mouth once daily.      sildenafil (Viagra) 100 mg tablet Take 1 tablet (100 mg) by mouth if needed for erectile dysfunction. 10 tablet 11     No current facility-administered medications on file prior to visit.         Review of Systems   Constitutional: Negative for diaphoresis, fever and malaise/fatigue.   HENT:  Negative for congestion and sore throat.    Eyes:  Negative for blurred vision and double vision.   Cardiovascular:  Negative for chest pain, dyspnea on exertion, irregular heartbeat, leg swelling, near-syncope, orthopnea, palpitations, paroxysmal nocturnal dyspnea and syncope.   Respiratory:  Negative for cough, hemoptysis, shortness of breath, snoring and sputum production.    Hematologic/Lymphatic: Negative for bleeding problem.   Skin:  Negative for rash.   Musculoskeletal:  Negative for falls, joint pain and myalgias.   Gastrointestinal:  Negative for abdominal pain, diarrhea, nausea and vomiting.   Neurological:  Negative for dizziness, headaches, light-headedness and weakness. "   All other systems reviewed and are negative.      Objective   Constitutional:       Appearance: Healthy appearance. Not in distress.   Eyes:      Conjunctiva/sclera: Conjunctivae normal.   HENT:      Nose: Nose normal.    Mouth/Throat:      Pharynx: Oropharynx is clear.   Neck:      Vascular: No JVR. JVD normal.   Pulmonary:      Effort: Pulmonary effort is normal.      Breath sounds: Normal breath sounds. No wheezing. No rhonchi.   Chest:      Chest wall: Not tender to palpatation.   Cardiovascular:      Normal rate. Regular rhythm.      Murmurs: There is no murmur.      No rub.   Pulses:     Intact distal pulses.   Edema:     Peripheral edema absent.   Abdominal:      General: Bowel sounds are normal.      Palpations: Abdomen is soft.   Musculoskeletal: Normal range of motion.      Cervical back: Neck supple. Skin:     General: Skin is warm and dry.   Neurological:      Mental Status: Alert and oriented to person, place and time.      Motor: Motor function is intact.         Lab Review:   Lab Results   Component Value Date     02/22/2024    K 3.9 02/22/2024     02/22/2024    CO2 29 02/22/2024    BUN 14 02/22/2024    CREATININE 0.91 02/22/2024    GLUCOSE 94 02/22/2024    CALCIUM 9.4 02/22/2024     Lab Results   Component Value Date    WBC 6.8 01/24/2024    HGB 14.8 01/24/2024    HCT 45.3 01/24/2024    MCV 98 01/24/2024     01/24/2024       Assessment/Plan   The encounter diagnosis was Intermittent atrial fibrillation (Multi).  Patient is now almost 5 months post ablation and feeling good.  We are monitoring his rhythm through his pacemaker.  He does not have a home remote monitor to send transmissions, so he will follow-up with the device clinic again in 1 month to keep monitoring his battery life.    We discussed his episodes of SVT at length.  I discussed that he could restart a low-dose metoprolol and that may suppress these episodes.  Patient declined at this time as he is not having any  symptoms.    Continue Eliquis twice a day  Follow-up with me in 1 year or sooner as needed  Follow-up with device clinic in 1 month, we will continue to monitor his rhythm through his pacemaker

## 2024-06-27 LAB
ATRIAL RATE: 69 BPM
P AXIS: 54 DEGREES
P OFFSET: 179 MS
P ONSET: 123 MS
PR INTERVAL: 178 MS
Q ONSET: 212 MS
QRS COUNT: 12 BEATS
QRS DURATION: 106 MS
QT INTERVAL: 410 MS
QTC CALCULATION(BAZETT): 439 MS
QTC FREDERICIA: 429 MS
R AXIS: -41 DEGREES
T AXIS: 24 DEGREES
T OFFSET: 417 MS
VENTRICULAR RATE: 69 BPM

## 2024-08-14 ENCOUNTER — HOSPITAL ENCOUNTER (OUTPATIENT)
Dept: CARDIOLOGY | Facility: CLINIC | Age: 74
Discharge: HOME | End: 2024-08-14
Payer: MEDICARE

## 2024-08-14 DIAGNOSIS — I42.9 CARDIOMYOPATHY, UNSPECIFIED TYPE (MULTI): ICD-10-CM

## 2024-09-11 ENCOUNTER — HOSPITAL ENCOUNTER (OUTPATIENT)
Dept: CARDIOLOGY | Facility: CLINIC | Age: 74
Discharge: HOME | End: 2024-09-11
Payer: MEDICARE

## 2024-09-11 DIAGNOSIS — I42.9 CARDIOMYOPATHY, UNSPECIFIED TYPE (MULTI): ICD-10-CM

## 2024-09-25 ENCOUNTER — OFFICE VISIT (OUTPATIENT)
Dept: CARDIOLOGY | Facility: CLINIC | Age: 74
End: 2024-09-25
Payer: MEDICARE

## 2024-09-25 VITALS
WEIGHT: 249 LBS | DIASTOLIC BLOOD PRESSURE: 83 MMHG | HEART RATE: 88 BPM | SYSTOLIC BLOOD PRESSURE: 127 MMHG | RESPIRATION RATE: 16 BRPM | BODY MASS INDEX: 36.88 KG/M2 | HEIGHT: 69 IN | OXYGEN SATURATION: 97 %

## 2024-09-25 DIAGNOSIS — I48.0 INTERMITTENT ATRIAL FIBRILLATION (MULTI): ICD-10-CM

## 2024-09-25 DIAGNOSIS — R60.0 EDEMA LEG: ICD-10-CM

## 2024-09-25 DIAGNOSIS — R42 DIZZINESS: Primary | ICD-10-CM

## 2024-09-25 LAB
ATRIAL RATE: 84 BPM
P AXIS: 57 DEGREES
P OFFSET: 182 MS
P ONSET: 121 MS
PR INTERVAL: 182 MS
Q ONSET: 212 MS
QRS COUNT: 14 BEATS
QRS DURATION: 102 MS
QT INTERVAL: 388 MS
QTC CALCULATION(BAZETT): 458 MS
QTC FREDERICIA: 433 MS
R AXIS: -26 DEGREES
T AXIS: 4 DEGREES
T OFFSET: 406 MS
VENTRICULAR RATE: 84 BPM

## 2024-09-25 PROCEDURE — 3074F SYST BP LT 130 MM HG: CPT | Performed by: NURSE PRACTITIONER

## 2024-09-25 PROCEDURE — 3008F BODY MASS INDEX DOCD: CPT | Performed by: NURSE PRACTITIONER

## 2024-09-25 PROCEDURE — 1160F RVW MEDS BY RX/DR IN RCRD: CPT | Performed by: NURSE PRACTITIONER

## 2024-09-25 PROCEDURE — 99214 OFFICE O/P EST MOD 30 MIN: CPT | Performed by: NURSE PRACTITIONER

## 2024-09-25 PROCEDURE — 93005 ELECTROCARDIOGRAM TRACING: CPT | Performed by: NURSE PRACTITIONER

## 2024-09-25 PROCEDURE — 1159F MED LIST DOCD IN RCRD: CPT | Performed by: NURSE PRACTITIONER

## 2024-09-25 PROCEDURE — 3079F DIAST BP 80-89 MM HG: CPT | Performed by: NURSE PRACTITIONER

## 2024-09-25 PROCEDURE — 1036F TOBACCO NON-USER: CPT | Performed by: NURSE PRACTITIONER

## 2024-09-25 RX ORDER — MECLIZINE HCL 12.5 MG 12.5 MG/1
12.5 TABLET ORAL 3 TIMES DAILY PRN
Qty: 30 TABLET | Refills: 0 | Status: SHIPPED | OUTPATIENT
Start: 2024-09-25 | End: 2024-09-30 | Stop reason: SDUPTHER

## 2024-09-30 ENCOUNTER — APPOINTMENT (OUTPATIENT)
Dept: PRIMARY CARE | Facility: CLINIC | Age: 74
End: 2024-09-30
Payer: MEDICARE

## 2024-09-30 VITALS
HEART RATE: 98 BPM | OXYGEN SATURATION: 97 % | SYSTOLIC BLOOD PRESSURE: 134 MMHG | TEMPERATURE: 97.4 F | DIASTOLIC BLOOD PRESSURE: 76 MMHG

## 2024-09-30 DIAGNOSIS — H61.23 BILATERAL IMPACTED CERUMEN: ICD-10-CM

## 2024-09-30 DIAGNOSIS — H83.09 LABYRINTHITIS, UNSPECIFIED LATERALITY: Primary | ICD-10-CM

## 2024-09-30 DIAGNOSIS — R42 DIZZINESS: ICD-10-CM

## 2024-09-30 PROCEDURE — 99214 OFFICE O/P EST MOD 30 MIN: CPT | Performed by: FAMILY MEDICINE

## 2024-09-30 PROCEDURE — 3078F DIAST BP <80 MM HG: CPT | Performed by: FAMILY MEDICINE

## 2024-09-30 PROCEDURE — 3075F SYST BP GE 130 - 139MM HG: CPT | Performed by: FAMILY MEDICINE

## 2024-09-30 PROCEDURE — 1159F MED LIST DOCD IN RCRD: CPT | Performed by: FAMILY MEDICINE

## 2024-09-30 RX ORDER — MECLIZINE HCL 12.5 MG 12.5 MG/1
25 TABLET ORAL
Qty: 40 TABLET | Refills: 1 | Status: SHIPPED | OUTPATIENT
Start: 2024-09-30 | End: 2024-09-30 | Stop reason: SDUPTHER

## 2024-09-30 RX ORDER — MECLIZINE HYDROCHLORIDE 25 MG/1
25 TABLET ORAL 3 TIMES DAILY PRN
Qty: 30 TABLET | Refills: 1 | Status: SHIPPED | OUTPATIENT
Start: 2024-09-30

## 2024-09-30 RX ORDER — MECLIZINE HCL 12.5 MG 12.5 MG/1
25 TABLET ORAL
Qty: 40 TABLET | Refills: 1 | Status: SHIPPED | OUTPATIENT
Start: 2024-09-30 | End: 2024-10-03

## 2024-09-30 RX ORDER — MECLIZINE HYDROCHLORIDE 25 MG/1
25 TABLET ORAL 3 TIMES DAILY PRN
COMMUNITY
End: 2024-09-30 | Stop reason: SDUPTHER

## 2024-09-30 ASSESSMENT — ENCOUNTER SYMPTOMS
WEAKNESS: 0
FALLS: 0
NAUSEA: 0
IRREGULAR HEARTBEAT: 0
SHORTNESS OF BREATH: 0
ORTHOPNEA: 0
VOMITING: 0
SYNCOPE: 0
FEVER: 0
DIAPHORESIS: 0
HEADACHES: 0
LIGHT-HEADEDNESS: 1
MYALGIAS: 0
HEMOPTYSIS: 0
SNORING: 0
NEAR-SYNCOPE: 0
PALPITATIONS: 0
BLURRED VISION: 0
DIARRHEA: 0
DIZZINESS: 1
COUGH: 0
DYSPNEA ON EXERTION: 0
PND: 0
SPUTUM PRODUCTION: 0
ABDOMINAL PAIN: 0
DOUBLE VISION: 0
SORE THROAT: 0

## 2024-09-30 NOTE — PATIENT INSTRUCTIONS
TAKE TWO OF THE 12.5 MG ANTIVERTS 4X DAILY     A NEW SCRIPT SENT FOR THE 25 MG TABLETS TO TAKE 4 X DAILY  MAY NEED ENT   REMOVE THE WAX

## 2024-09-30 NOTE — PROGRESS NOTES
Subjective   Fabricio Bajwa is a 73 y.o. male.    Chief Complaint:  Medication Problem (Medication question: wonders if he should go back on metoprolol) and Dizziness    Fabricio Bajwa is a 73 y.o. with:    1.Hx of MI / CAD - s/p PCI to LCX (2014) Xience 2/5/2028 DONTAE, at [facility],   2. ICM  - s/p CD defibrillator Medtronic, implanted (Mar 2014).  Serial number BB [id number], model number DD BB 1 D4 by Dr. Wilmer Patel.  3. Paroxysmal AF      Oct 2022:  AT / AF plus SVT noted on Dual-chamber ICD check      April 2023: Inappropriate AICD shock -according to [facility] cardiology note, was due to AF with RVR entering the VF zone and reset patient into NSR.   Metoprolol increased to 50 mg BID (from 25 mg BID) and reprogrammed VF zone to 194 bpm and f/u visit in July noted device interrogation STATUS:   Battery: The battery shows MOL depletion, estimate 2.7 years to PEDRO.   Lead(s): Lead impedence, sensing, thresholds are reported satisfactory.   Therapies: None   Atrial arrhythmias: AT/AF 2 events, longest 39 sec, burden < 0.1%   VHR/VT/VF: 629 NSVT      TESTING:      -Nuclear Stress:  (May 2022) Mild (>10%) ischemia in LCx.  Large (>20%) fixed perfusion defect in LCx.   LV mildly dilated and systolic function mildly decreased.  LVEF 45%.    -[facility]: (Oct 2022) Native Coronary Artery Disease in the LCX (Patent stent in Mid Lcx, 20% mild tubular stenosis proximal to the stent.) and Mild LV dysfunction EF 45%  -TTE:  (May 2021) LVEF 53 +/- 5%.  Mild LVH.  Suboptimal image quality noted.     Now s/p AVNRT and Afib RFA with Dr. Wong 2/2/2024  ECG 3/5/24 NSR HR 65 bpm  Device check 4/24: Last episode of Afib 3/12/24, less than 3 minutes  ECG 4/24/2024 NSR HR 60 bpm  Device check 6/26/2024: Since last check, he had 1.5 minutes of Afib, there were some episode of SVT with HR in the 130s-140s. Longest episodes was 30 minutes. These mostly occurred 6/11 between 4:45 pm and 5:15 pm and then 6/12 in the morning.   "Patient states he was cutting the grass and then mixing and pouring concrete during this time, he denies any symptoms  ECG 6/26/2024 NSR HR 69 bpm, left axis deviation  ECG 9/25/2024 NSR HR 84 bpm  Device check 9/11/2024 0 Atrial arrhythmias    TODAY Patient presents for evaluation of dizziness.  He feels like whenever he moves his head certain ways, the room starts spinning.    /83 (BP Location: Right arm)   Pulse 88   Resp 16   Ht 1.753 m (5' 9\")   Wt 113 kg (249 lb)   SpO2 97%   BMI 36.77 kg/m²   Current Outpatient Medications on File Prior to Visit   Medication Sig Dispense Refill    aspirin 81 mg EC tablet Take 1 tablet (81 mg) by mouth once daily.      Eliquis 5 mg tablet Take 1 tablet (5 mg) by mouth 2 times a day. 180 tablet 3    multivitamin capsule Take 1 capsule by mouth once daily.      sildenafil (Viagra) 100 mg tablet Take 1 tablet (100 mg) by mouth if needed for erectile dysfunction. 10 tablet 11     No current facility-administered medications on file prior to visit.         Review of Systems   Constitutional: Negative for diaphoresis, fever and malaise/fatigue.   HENT:  Negative for congestion and sore throat.    Eyes:  Negative for blurred vision and double vision.   Cardiovascular:  Negative for chest pain, dyspnea on exertion, irregular heartbeat, leg swelling, near-syncope, orthopnea, palpitations, paroxysmal nocturnal dyspnea and syncope.   Respiratory:  Negative for cough, hemoptysis, shortness of breath, snoring and sputum production.    Hematologic/Lymphatic: Negative for bleeding problem.   Skin:  Negative for rash.   Musculoskeletal:  Negative for falls, joint pain and myalgias.   Gastrointestinal:  Negative for abdominal pain, diarrhea, nausea and vomiting.   Neurological:  Positive for dizziness and light-headedness. Negative for headaches and weakness.   All other systems reviewed and are negative.      Objective   Constitutional:       Appearance: Healthy appearance. Not in " distress.   Eyes:      Conjunctiva/sclera: Conjunctivae normal.   HENT:      Nose: Nose normal.    Mouth/Throat:      Pharynx: Oropharynx is clear.   Neck:      Vascular: No JVR. JVD normal.   Pulmonary:      Effort: Pulmonary effort is normal.      Breath sounds: Normal breath sounds. No wheezing. No rhonchi.   Chest:      Chest wall: Not tender to palpatation.   Cardiovascular:      Normal rate. Regular rhythm.      Murmurs: There is a grade 2/6 systolic murmur.      No rub.      Comments: Right sided soft carotid bruit  Pulses:     Intact distal pulses.   Edema:     Peripheral edema absent.   Abdominal:      General: Bowel sounds are normal.      Palpations: Abdomen is soft.   Musculoskeletal: Normal range of motion.      Cervical back: Neck supple. Skin:     General: Skin is warm and dry.   Neurological:      Mental Status: Alert and oriented to person, place and time.      Motor: Motor function is intact.         Lab Review:   Lab Results   Component Value Date     02/22/2024    K 3.9 02/22/2024     02/22/2024    CO2 29 02/22/2024    BUN 14 02/22/2024    CREATININE 0.91 02/22/2024    GLUCOSE 94 02/22/2024    CALCIUM 9.4 02/22/2024     Lab Results   Component Value Date    WBC 6.8 01/24/2024    HGB 14.8 01/24/2024    HCT 45.3 01/24/2024    MCV 98 01/24/2024     01/24/2024       Assessment/Plan   The primary encounter diagnosis was Dizziness. Diagnoses of Intermittent atrial fibrillation (Multi) and Edema leg were also pertinent to this visit.  Patient presents complaining of dizziness. Specifically, like the room is spinning when he lays down, bends forward, or stands up.  These symptoms sound most likely like vertigo.  However, there is also dizziness/LH when he stands up or bends forward  Aortic Murmur heard on exam, with slight radiation up to the right carotid.    We will get an echo and vascular ultrasound of the carotid  Patient can trial meclizine and follow up with PCP next week.

## 2024-09-30 NOTE — PROGRESS NOTES
Subjective   Patient ID: Fabricio Bajwa is a 73 y.o. male who presents for Dizziness (ON AND OFF THROUGHOUT THE DAY- RANDOM- NO TRIGGERS. NO RECENT MED CHANGES. THIS HAS BEEN GOING AROUND 2 WEEKS POSSIBLY LONGER.).    HPI KNOWN ATRIAL FIB THAT IS STABLE \FOLLOWS CARDIOLOGY   THEY EVALUATED HIM AND ORDERED A CAROTID USN AND ALSO THE 12.5 MECLIZINE WHICH HELPED MAYBE BUT IT WAS PRN BY THE TIME HE TOOK IT THE SPELL MAY BE BETTER  IT IS WHIRLING AND WITH MOTION     Review of Systems   Constitutional:  Negative for activity change, appetite change, fever and unexpected weight change.   HENT:  Negative for congestion, ear pain, postnasal drip, rhinorrhea, sinus pressure and sore throat.    Eyes:  Negative for discharge, itching and visual disturbance.   Respiratory:  Negative for cough, shortness of breath and wheezing.    Cardiovascular:  Negative for chest pain, palpitations and leg swelling.        SEE HPI    Gastrointestinal:  Negative for abdominal pain, blood in stool, constipation, diarrhea, nausea and vomiting.   Endocrine: Negative for cold intolerance, heat intolerance and polyuria.   Genitourinary:  Negative for dysuria, flank pain and hematuria.   Musculoskeletal:  Negative for arthralgias, gait problem, joint swelling and myalgias.   Skin:  Negative for rash.   Allergic/Immunologic: Negative for environmental allergies and food allergies.   Neurological:  Positive for dizziness and light-headedness. Negative for syncope, weakness, numbness and headaches.   Psychiatric/Behavioral:  Negative for dysphoric mood and sleep disturbance. The patient is not nervous/anxious.        Objective   /76   Pulse 98   Temp 36.3 °C (97.4 °F)   SpO2 97%     Physical Exam  Vitals and nursing note reviewed.   Constitutional:       Appearance: Normal appearance.   HENT:      Head: Normocephalic.      Right Ear: There is impacted cerumen.      Left Ear: There is impacted cerumen.      Ears:      Comments: OFFERED TO  REMOVE THIS AND HE DECLINED SAYING THAT HE WILL DO IT AT HOME   Cardiovascular:      Rate and Rhythm: Normal rate. Rhythm irregular.      Pulses: Normal pulses.      Heart sounds: Normal heart sounds. No murmur heard.     No friction rub. No gallop.      Comments: STABLE A FIB   Pulmonary:      Effort: Pulmonary effort is normal. No respiratory distress.      Breath sounds: Normal breath sounds. No wheezing.   Abdominal:      General: Bowel sounds are normal. There is no distension.      Palpations: Abdomen is soft.      Tenderness: There is no abdominal tenderness.   Musculoskeletal:         General: No deformity. Normal range of motion.   Skin:     General: Skin is warm and dry.      Capillary Refill: Capillary refill takes less than 2 seconds.   Neurological:      General: No focal deficit present.      Mental Status: He is alert and oriented to person, place, and time.   Psychiatric:         Mood and Affect: Mood normal.         Assessment/Plan   Problem List Items Addressed This Visit             ICD-10-CM    Labyrinthitis - Primary H83.09    Relevant Medications    meclizine (Antivert) 25 mg tablet    Dizziness R42    Relevant Medications    meclizine (Antivert) 25 mg tablet    Bilateral impacted cerumen H61.23

## 2024-10-03 PROBLEM — R42 DIZZINESS: Status: ACTIVE | Noted: 2024-10-03

## 2024-10-03 PROBLEM — H83.09 LABYRINTHITIS: Status: ACTIVE | Noted: 2024-10-03

## 2024-10-03 PROBLEM — H61.23 BILATERAL IMPACTED CERUMEN: Status: ACTIVE | Noted: 2024-10-03

## 2024-10-03 ASSESSMENT — ENCOUNTER SYMPTOMS
RHINORRHEA: 0
COUGH: 0
LIGHT-HEADEDNESS: 1
DYSPHORIC MOOD: 0
PALPITATIONS: 0
FLANK PAIN: 0
WEAKNESS: 0
NUMBNESS: 0
BLOOD IN STOOL: 0
HEADACHES: 0
CONSTIPATION: 0
APPETITE CHANGE: 0
ARTHRALGIAS: 0
DYSURIA: 0
MYALGIAS: 0
SHORTNESS OF BREATH: 0
HEMATURIA: 0
FEVER: 0
EYE ITCHING: 0
SORE THROAT: 0
JOINT SWELLING: 0
SLEEP DISTURBANCE: 0
WHEEZING: 0
ACTIVITY CHANGE: 0
VOMITING: 0
NAUSEA: 0
EYE DISCHARGE: 0
UNEXPECTED WEIGHT CHANGE: 0
DIARRHEA: 0
SINUS PRESSURE: 0
DIZZINESS: 1
ABDOMINAL PAIN: 0
NERVOUS/ANXIOUS: 0

## 2024-10-11 ENCOUNTER — HOSPITAL ENCOUNTER (OUTPATIENT)
Dept: VASCULAR MEDICINE | Facility: CLINIC | Age: 74
Discharge: HOME | End: 2024-10-11
Payer: MEDICARE

## 2024-10-11 ENCOUNTER — HOSPITAL ENCOUNTER (OUTPATIENT)
Dept: CARDIOLOGY | Facility: CLINIC | Age: 74
Discharge: HOME | End: 2024-10-11
Payer: MEDICARE

## 2024-10-11 DIAGNOSIS — R60.0 EDEMA LEG: ICD-10-CM

## 2024-10-11 DIAGNOSIS — R42 DIZZINESS: ICD-10-CM

## 2024-10-11 PROCEDURE — 93306 TTE W/DOPPLER COMPLETE: CPT

## 2024-10-11 PROCEDURE — 93880 EXTRACRANIAL BILAT STUDY: CPT

## 2024-10-11 PROCEDURE — 93880 EXTRACRANIAL BILAT STUDY: CPT | Performed by: INTERNAL MEDICINE

## 2024-10-14 LAB
AORTIC VALVE PEAK VELOCITY: 1.38 M/S
AV PEAK GRADIENT: 7.6 MMHG
AVA (PEAK VEL): 2.89 CM2
EJECTION FRACTION APICAL 4 CHAMBER: 45.5
EJECTION FRACTION: 43 %
LEFT ATRIUM VOLUME AREA LENGTH INDEX BSA: 27.3 ML/M2
LEFT VENTRICLE INTERNAL DIMENSION DIASTOLE: 5.3 CM (ref 3.5–6)
LEFT VENTRICULAR OUTFLOW TRACT DIAMETER: 2.2 CM
MITRAL VALVE E/A RATIO: 0.82
MITRAL VALVE E/E' RATIO: 17.7
RIGHT VENTRICLE FREE WALL PEAK S': 11.7 CM/S
RIGHT VENTRICLE PEAK SYSTOLIC PRESSURE: 35.5 MMHG
TRICUSPID ANNULAR PLANE SYSTOLIC EXCURSION: 2.3 CM

## 2024-10-23 ENCOUNTER — HOSPITAL ENCOUNTER (OUTPATIENT)
Dept: CARDIOLOGY | Facility: CLINIC | Age: 74
Discharge: HOME | End: 2024-10-23
Payer: MEDICARE

## 2024-10-23 DIAGNOSIS — I48.91 ATRIAL FIBRILLATION, UNSPECIFIED TYPE (MULTI): ICD-10-CM

## 2024-10-25 DIAGNOSIS — R94.31 ABNORMAL ELECTROCARDIOGRAM (ECG) (EKG): ICD-10-CM

## 2024-10-25 DIAGNOSIS — I42.9 CARDIOMYOPATHY, UNSPECIFIED TYPE (MULTI): Primary | ICD-10-CM

## 2024-10-25 RX ORDER — AMINOPHYLLINE 25 MG/ML
125 INJECTION, SOLUTION INTRAVENOUS ONCE AS NEEDED
OUTPATIENT
Start: 2024-10-25

## 2024-10-25 RX ORDER — REGADENOSON 0.08 MG/ML
0.4 INJECTION, SOLUTION INTRAVENOUS
OUTPATIENT
Start: 2024-10-25

## 2024-10-25 NOTE — PROGRESS NOTES
Patient called with Carotid ultrasound results and echo results.  Echo with slightly reduced EF from last check  I discussed with Dr. Wong and we will get a stress test.  Patient is unable to walk the treadmill due to his hips.  Carotid ultrasound did not show any significant blockage    Patient states his dizziness is much better after his PCP adjusted the meclizine.

## 2024-11-02 DIAGNOSIS — R42 DIZZINESS: ICD-10-CM

## 2024-11-02 DIAGNOSIS — H83.09 LABYRINTHITIS, UNSPECIFIED LATERALITY: ICD-10-CM

## 2024-11-05 RX ORDER — MECLIZINE HYDROCHLORIDE 25 MG/1
TABLET ORAL
Qty: 30 TABLET | Refills: 1 | Status: SHIPPED | OUTPATIENT
Start: 2024-11-05

## 2024-11-27 ENCOUNTER — HOSPITAL ENCOUNTER (OUTPATIENT)
Dept: CARDIOLOGY | Facility: CLINIC | Age: 74
Discharge: HOME | End: 2024-11-27
Payer: MEDICARE

## 2024-11-27 DIAGNOSIS — I48.91 ATRIAL FIBRILLATION, UNSPECIFIED TYPE (MULTI): ICD-10-CM

## 2024-11-27 PROCEDURE — 93283 PRGRMG EVAL IMPLANTABLE DFB: CPT

## 2024-11-29 ENCOUNTER — HOSPITAL ENCOUNTER (OUTPATIENT)
Dept: RADIOLOGY | Facility: HOSPITAL | Age: 74
Discharge: HOME | End: 2024-11-29
Payer: MEDICARE

## 2024-11-29 ENCOUNTER — HOSPITAL ENCOUNTER (OUTPATIENT)
Dept: CARDIOLOGY | Facility: HOSPITAL | Age: 74
Discharge: HOME | End: 2024-11-29
Payer: MEDICARE

## 2024-11-29 DIAGNOSIS — I42.9 CARDIOMYOPATHY, UNSPECIFIED TYPE (MULTI): ICD-10-CM

## 2024-11-29 DIAGNOSIS — R94.31 ABNORMAL ELECTROCARDIOGRAM (ECG) (EKG): ICD-10-CM

## 2024-11-29 PROCEDURE — 93017 CV STRESS TEST TRACING ONLY: CPT

## 2024-11-29 PROCEDURE — 78452 HT MUSCLE IMAGE SPECT MULT: CPT

## 2024-11-29 PROCEDURE — 3430000001 HC RX 343 DIAGNOSTIC RADIOPHARMACEUTICALS: Performed by: NURSE PRACTITIONER

## 2024-11-29 PROCEDURE — 2500000004 HC RX 250 GENERAL PHARMACY W/ HCPCS (ALT 636 FOR OP/ED): Performed by: NURSE PRACTITIONER

## 2024-11-29 PROCEDURE — 78452 HT MUSCLE IMAGE SPECT MULT: CPT | Performed by: STUDENT IN AN ORGANIZED HEALTH CARE EDUCATION/TRAINING PROGRAM

## 2024-11-29 PROCEDURE — A9502 TC99M TETROFOSMIN: HCPCS | Performed by: NURSE PRACTITIONER

## 2024-11-29 PROCEDURE — 93016 CV STRESS TEST SUPVJ ONLY: CPT | Performed by: INTERNAL MEDICINE

## 2024-11-29 PROCEDURE — 93018 CV STRESS TEST I&R ONLY: CPT | Performed by: INTERNAL MEDICINE

## 2024-11-29 RX ORDER — REGADENOSON 0.08 MG/ML
0.4 INJECTION, SOLUTION INTRAVENOUS
Status: COMPLETED | OUTPATIENT
Start: 2024-11-29 | End: 2024-11-29

## 2024-12-06 ENCOUNTER — TELEPHONE (OUTPATIENT)
Dept: CARDIOLOGY | Facility: CLINIC | Age: 74
End: 2024-12-06
Payer: MEDICARE

## 2024-12-06 DIAGNOSIS — I42.9 CARDIOMYOPATHY, UNSPECIFIED TYPE (MULTI): Primary | ICD-10-CM

## 2024-12-06 NOTE — TELEPHONE ENCOUNTER
Patient called with stress test results.  No new findings concerning for ischemia.  He does have some fixed defects with overall EF of 45%  Discussed with Dr. Wong, we recommend establishing with a general cardiologist to monitor his CAD.  Patient expresses understanding, but is not sure he is going to do this right now since he is feeling okay.  All questions asked and answered

## 2025-01-07 LAB
ATRIAL RATE: 60 BPM
ATRIAL RATE: 69 BPM
ATRIAL RATE: 84 BPM
P AXIS: 52 DEGREES
P AXIS: 54 DEGREES
P AXIS: 57 DEGREES
P OFFSET: 173 MS
P OFFSET: 179 MS
P OFFSET: 182 MS
P ONSET: 120 MS
P ONSET: 121 MS
P ONSET: 123 MS
PR INTERVAL: 178 MS
PR INTERVAL: 182 MS
PR INTERVAL: 186 MS
Q ONSET: 212 MS
Q ONSET: 212 MS
Q ONSET: 213 MS
QRS COUNT: 10 BEATS
QRS COUNT: 12 BEATS
QRS COUNT: 14 BEATS
QRS DURATION: 102 MS
QRS DURATION: 106 MS
QRS DURATION: 106 MS
QT INTERVAL: 388 MS
QT INTERVAL: 410 MS
QT INTERVAL: 428 MS
QTC CALCULATION(BAZETT): 428 MS
QTC CALCULATION(BAZETT): 439 MS
QTC CALCULATION(BAZETT): 458 MS
QTC FREDERICIA: 428 MS
QTC FREDERICIA: 429 MS
QTC FREDERICIA: 433 MS
R AXIS: -26 DEGREES
R AXIS: -37 DEGREES
R AXIS: -41 DEGREES
T AXIS: 24 DEGREES
T AXIS: 4 DEGREES
T AXIS: 41 DEGREES
T OFFSET: 406 MS
T OFFSET: 417 MS
T OFFSET: 427 MS
VENTRICULAR RATE: 60 BPM
VENTRICULAR RATE: 69 BPM
VENTRICULAR RATE: 84 BPM

## 2025-01-08 ENCOUNTER — APPOINTMENT (OUTPATIENT)
Dept: CARDIOLOGY | Facility: CLINIC | Age: 75
End: 2025-01-08
Payer: MEDICARE

## 2025-01-09 ENCOUNTER — APPOINTMENT (OUTPATIENT)
Dept: CARDIOLOGY | Facility: CLINIC | Age: 75
End: 2025-01-09
Payer: MEDICARE

## 2025-01-22 ENCOUNTER — HOSPITAL ENCOUNTER (OUTPATIENT)
Dept: CARDIOLOGY | Facility: CLINIC | Age: 75
Discharge: HOME | End: 2025-01-22
Payer: MEDICARE

## 2025-01-22 DIAGNOSIS — I48.91 ATRIAL FIBRILLATION, UNSPECIFIED TYPE (MULTI): ICD-10-CM

## 2025-01-22 PROCEDURE — 93283 PRGRMG EVAL IMPLANTABLE DFB: CPT

## 2025-01-22 PROCEDURE — 93283 PRGRMG EVAL IMPLANTABLE DFB: CPT | Performed by: INTERNAL MEDICINE

## 2025-01-23 ENCOUNTER — OFFICE VISIT (OUTPATIENT)
Dept: CARDIOLOGY | Facility: CLINIC | Age: 75
End: 2025-01-23
Payer: MEDICARE

## 2025-01-23 VITALS
HEART RATE: 80 BPM | BODY MASS INDEX: 37.77 KG/M2 | WEIGHT: 255 LBS | HEIGHT: 69 IN | SYSTOLIC BLOOD PRESSURE: 115 MMHG | OXYGEN SATURATION: 93 % | DIASTOLIC BLOOD PRESSURE: 75 MMHG | RESPIRATION RATE: 16 BRPM

## 2025-01-23 DIAGNOSIS — I25.5 CARDIOMYOPATHY, ISCHEMIC: ICD-10-CM

## 2025-01-23 DIAGNOSIS — I48.0 PAROXYSMAL ATRIAL FIBRILLATION (MULTI): ICD-10-CM

## 2025-01-23 DIAGNOSIS — I42.9 CARDIOMYOPATHY, UNSPECIFIED TYPE (MULTI): ICD-10-CM

## 2025-01-23 DIAGNOSIS — I25.10 CORONARY ARTERY DISEASE INVOLVING NATIVE CORONARY ARTERY OF NATIVE HEART WITHOUT ANGINA PECTORIS: Primary | ICD-10-CM

## 2025-01-23 PROCEDURE — 99214 OFFICE O/P EST MOD 30 MIN: CPT | Performed by: INTERNAL MEDICINE

## 2025-01-23 PROCEDURE — 3074F SYST BP LT 130 MM HG: CPT | Performed by: INTERNAL MEDICINE

## 2025-01-23 PROCEDURE — 3078F DIAST BP <80 MM HG: CPT | Performed by: INTERNAL MEDICINE

## 2025-01-23 PROCEDURE — 3008F BODY MASS INDEX DOCD: CPT | Performed by: INTERNAL MEDICINE

## 2025-01-23 PROCEDURE — 1036F TOBACCO NON-USER: CPT | Performed by: INTERNAL MEDICINE

## 2025-01-23 PROCEDURE — 1159F MED LIST DOCD IN RCRD: CPT | Performed by: INTERNAL MEDICINE

## 2025-01-23 RX ORDER — APIXABAN 5 MG/1
5 TABLET, FILM COATED ORAL 2 TIMES DAILY
Qty: 180 TABLET | Refills: 3 | Status: SHIPPED | OUTPATIENT
Start: 2025-01-23 | End: 2026-01-23

## 2025-01-23 ASSESSMENT — ENCOUNTER SYMPTOMS
ALTERED MENTAL STATUS: 0
BLOATING: 0
CONSTIPATION: 0
HEMATURIA: 0
HEADACHES: 0
MEMORY LOSS: 0
FEVER: 0
VOMITING: 0
ABDOMINAL PAIN: 0
NAUSEA: 0
CHILLS: 0
FALLS: 0
MYALGIAS: 0
DIARRHEA: 0
DEPRESSION: 0
WHEEZING: 0
HEMOPTYSIS: 0
DYSURIA: 0
COUGH: 0

## 2025-01-23 NOTE — PROGRESS NOTES
Chief Complaint   Patient presents with    [facility]    Coronary Artery Disease    Cardiomyopathy    Atrial Fibrillation       HPI  75 yo WM w/ h/o CAD s/p LCX stent '14, s/p MI, ICM s/p ICD, parox AFIB/SVT s/p RFA  now here for cardiology consult. No chest pain. No dyspnea at rest. No FISHER. No orthopnea/PND. No palps. No further LH/dizzy. No syncope. +ch mild LE edema. No claudication. No cough.   ECG : SR (84), low volt, ?AMI  Echo 10/24: EF 40-45%, bas-mid infpost sev HK  Nuc : no ischemia, lat infarct, mild LVE EF 45%  Cath 10/22: LCX stent patent  Carotid US 10/24: plaque, no HDSS  ICD check : A pace 0%, V pace 0%, no arrhythmia  ICD check : A pace 1%, V pace 1%, 1 AT/AF x 47s    Review of Systems   Constitutional: Negative for chills, fever and malaise/fatigue.   HENT:  Negative for hearing loss.    Eyes:  Negative for visual disturbance.   Respiratory:  Negative for cough, hemoptysis and wheezing.    Skin:  Negative for rash.   Musculoskeletal:  Negative for falls and myalgias.   Gastrointestinal:  Negative for bloating, abdominal pain, constipation, diarrhea, dysphagia, nausea and vomiting.   Genitourinary:  Negative for dysuria and hematuria.   Neurological:  Negative for headaches.   Psychiatric/Behavioral:  Negative for altered mental status, depression and memory loss.       Social History     Tobacco Use    Smoking status: Former     Current packs/day: 0.00     Average packs/day: 1.5 packs/day for 55.0 years (82.5 ttl pk-yrs)     Types: Cigarettes     Start date: 1965     Quit date: 2005     Years since quittin.5    Smokeless tobacco: Never   Substance Use Topics    Alcohol use: Not Currently     Alcohol/week: 2.0 standard drinks of alcohol     Types: 2 Cans of beer per week     Comment: Very light drinker beer only      Family History   Problem Relation Name Age of Onset    COPD Mother      Alcohol abuse Father Fabricio Bajwa Senior     Heart disease Father Fabricio  Garett Senior     Kidney disease Sister      Diabetes Sister      COPD Sister      Heart disease Sister Isabel Bajwa     Asthma Sister Mauricio Arias     No Known Problems Sister      Liver disease Sister      Hepatitis Sister      Hepatitis Brother        Allergies   Allergen Reactions    Hydrocodone-Acetaminophen Nausea Only     nausea and lightheaded    Oxycodone Itching      Current Outpatient Medications   Medication Instructions    aspirin 81 mg, Daily    Eliquis 5 mg, oral, 2 times daily    multivitamin capsule 1 capsule, Daily RT      Vitals:    01/23/25 0914   BP: 115/75   Pulse: 80   Resp: 16   SpO2: 93%      Physical Exam  Constitutional:       Appearance: Normal appearance.   HENT:      Head: Normocephalic and atraumatic.      Nose: Nose normal.   Neck:      Vascular: No carotid bruit.   Cardiovascular:      Rate and Rhythm: Normal rate and regular rhythm.      Heart sounds: No murmur heard.  Pulmonary:      Effort: Pulmonary effort is normal.      Breath sounds: Normal breath sounds.   Abdominal:      Palpations: Abdomen is soft.      Tenderness: There is no abdominal tenderness.   Musculoskeletal:      Right lower leg: Edema present.      Left lower leg: Edema present.      Comments: Tr-1+ pitting LE edema   Skin:     General: Skin is warm and dry.   Neurological:      General: No focal deficit present.      Mental Status: He is alert.   Psychiatric:         Mood and Affect: Mood normal.         Judgment: Judgment normal.        Labs  2/24 Cr 0.91, K 3.9, LFT nl, , HDL 59, , Chol 210  1/24 HGB 14.8,     Assessment/Plan   73 yo WM w/ h/o CAD s/p LCX stent '14, s/p MI, ICM s/p ICD, parox AFIB/SVT s/p RFA 2/24. Doing fair. No sig cardiac symptoms. Appears mildly decomp with only tr-1+ pitting LE edema (doesn't bother him). He requested minimization of meds; does not want to add any meds.  -continue ASA 81 every day  -continue Eliquis 5 bid  -he defers BB, ARB, statin  -continue ICD  checks  -FU 1 year (earlier if needed)    Elgin Singh MD

## 2025-02-21 DIAGNOSIS — I48.0 PAROXYSMAL ATRIAL FIBRILLATION (MULTI): Primary | ICD-10-CM

## 2025-02-24 ENCOUNTER — APPOINTMENT (OUTPATIENT)
Dept: PRIMARY CARE | Facility: CLINIC | Age: 75
End: 2025-02-24
Payer: MEDICARE

## 2025-02-24 VITALS
BODY MASS INDEX: 38.1 KG/M2 | OXYGEN SATURATION: 93 % | TEMPERATURE: 98.5 F | DIASTOLIC BLOOD PRESSURE: 72 MMHG | WEIGHT: 258 LBS | HEART RATE: 102 BPM | SYSTOLIC BLOOD PRESSURE: 114 MMHG

## 2025-02-24 DIAGNOSIS — M75.51 BURSITIS OF RIGHT SHOULDER: Primary | ICD-10-CM

## 2025-02-24 PROCEDURE — 20605 DRAIN/INJ JOINT/BURSA W/O US: CPT | Performed by: FAMILY MEDICINE

## 2025-02-24 PROCEDURE — 1159F MED LIST DOCD IN RCRD: CPT | Performed by: FAMILY MEDICINE

## 2025-02-24 PROCEDURE — 99214 OFFICE O/P EST MOD 30 MIN: CPT | Performed by: FAMILY MEDICINE

## 2025-02-24 PROCEDURE — 3078F DIAST BP <80 MM HG: CPT | Performed by: FAMILY MEDICINE

## 2025-02-24 PROCEDURE — 3074F SYST BP LT 130 MM HG: CPT | Performed by: FAMILY MEDICINE

## 2025-02-24 RX ORDER — TRIAMCINOLONE ACETONIDE 40 MG/ML
40 INJECTION, SUSPENSION INTRA-ARTICULAR; INTRAMUSCULAR ONCE
Status: COMPLETED | OUTPATIENT
Start: 2025-02-24 | End: 2025-02-24

## 2025-02-24 RX ORDER — LIDOCAINE HYDROCHLORIDE 10 MG/ML
1 INJECTION, SOLUTION INFILTRATION; PERINEURAL ONCE
Status: COMPLETED | OUTPATIENT
Start: 2025-02-24 | End: 2025-02-24

## 2025-02-24 RX ADMIN — TRIAMCINOLONE ACETONIDE 40 MG: 40 INJECTION, SUSPENSION INTRA-ARTICULAR; INTRAMUSCULAR at 09:00

## 2025-02-24 RX ADMIN — LIDOCAINE HYDROCHLORIDE 1 ML: 10 INJECTION, SOLUTION INFILTRATION; PERINEURAL at 08:59

## 2025-02-24 RX ADMIN — TRIAMCINOLONE ACETONIDE 40 MG: 40 INJECTION, SUSPENSION INTRA-ARTICULAR; INTRAMUSCULAR at 08:54

## 2025-02-24 ASSESSMENT — ENCOUNTER SYMPTOMS
APPETITE CHANGE: 0
NAUSEA: 0
DYSPHORIC MOOD: 0
HEADACHES: 0
RHINORRHEA: 0
DYSURIA: 0
LIGHT-HEADEDNESS: 0
NUMBNESS: 0
HEMATURIA: 0
FLANK PAIN: 0
ABDOMINAL PAIN: 0
DIZZINESS: 0
WEAKNESS: 0
ACTIVITY CHANGE: 0
COUGH: 0
BLOOD IN STOOL: 0
DIARRHEA: 0
VOMITING: 0
JOINT SWELLING: 0
FEVER: 0
NERVOUS/ANXIOUS: 0
ARTHRALGIAS: 1
PALPITATIONS: 0
SLEEP DISTURBANCE: 0
CONSTIPATION: 0
SORE THROAT: 0
WHEEZING: 0
EYE ITCHING: 0
MYALGIAS: 0
SINUS PRESSURE: 0
EYE DISCHARGE: 0
SHORTNESS OF BREATH: 1
UNEXPECTED WEIGHT CHANGE: 0

## 2025-02-24 ASSESSMENT — PATIENT HEALTH QUESTIONNAIRE - PHQ9
1. LITTLE INTEREST OR PLEASURE IN DOING THINGS: NOT AT ALL
2. FEELING DOWN, DEPRESSED OR HOPELESS: NOT AT ALL
SUM OF ALL RESPONSES TO PHQ9 QUESTIONS 1 AND 2: 0

## 2025-02-25 RX ORDER — TRIAMCINOLONE ACETONIDE 40 MG/ML
40 INJECTION, SUSPENSION INTRA-ARTICULAR; INTRAMUSCULAR
Status: COMPLETED | OUTPATIENT
Start: 2025-02-24 | End: 2025-02-24

## 2025-02-26 ENCOUNTER — HOSPITAL ENCOUNTER (OUTPATIENT)
Dept: CARDIOLOGY | Facility: CLINIC | Age: 75
Discharge: HOME | End: 2025-02-26
Payer: MEDICARE

## 2025-02-26 DIAGNOSIS — I48.91 ATRIAL FIBRILLATION, UNSPECIFIED TYPE (MULTI): ICD-10-CM

## 2025-03-10 DIAGNOSIS — Z95.810 ICD (IMPLANTABLE CARDIOVERTER-DEFIBRILLATOR) IN PLACE: ICD-10-CM

## 2025-03-10 DIAGNOSIS — I42.9 CARDIOMYOPATHY, UNSPECIFIED TYPE (MULTI): Primary | ICD-10-CM

## 2025-03-10 NOTE — PROGRESS NOTES
"  Pharmacist Clinic: Cardiology Management    Fabricio Bajwa is a 74 y.o. male was referred to Clinical Pharmacy Team for Anticoagulation management.     Referring Provider: Leticia Encinas, ZHANNA-*    THIS IS A NEW PATIENT APPOINTMENT. PATIENT WILL BE ESTABLISHING CARE WITH CLINICAL PHARMACY.    Appointment was completed by Fabricio who was reached at primary number.    Allergies Reviewed? Yes    Allergies   Allergen Reactions    Hydrocodone-Acetaminophen Nausea Only     nausea and lightheaded    Oxycodone Itching       Past Medical History:   Diagnosis Date    Allergic     Arthritis     Atrial fibrillation (Multi) 2015    Congenital heart disease     Heart disease     Myocardial infarction (Multi) 2014       Current Outpatient Medications on File Prior to Visit   Medication Sig Dispense Refill    aspirin 81 mg EC tablet Take 1 tablet (81 mg) by mouth once daily.      co-enzyme Q-10 30 mg capsule Take 1 capsule (30 mg) by mouth 2 times a day.      Eliquis 5 mg tablet Take 1 tablet (5 mg) by mouth 2 times a day. 180 tablet 3    multivitamin capsule Take 1 capsule by mouth once daily.      TURMERIC ORAL Take 1 capsule by mouth once daily.       No current facility-administered medications on file prior to visit.         RELEVANT LAB RESULTS:  Lab Results   Component Value Date    BILITOT 0.6 02/22/2024    CALCIUM 9.4 02/22/2024    CO2 29 02/22/2024     02/22/2024    CREATININE 0.91 02/22/2024    GLUCOSE 94 02/22/2024    ALKPHOS 68 02/22/2024    K 3.9 02/22/2024    PROT 7.2 02/22/2024     02/22/2024    AST 25 02/22/2024    ALT 18 02/22/2024    BUN 14 02/22/2024    ANIONGAP 13 02/22/2024    ALBUMIN 4.6 02/22/2024    AMYLASE 151 (H) 01/30/2020    LIPASE 64 (H) 02/10/2020     Lab Results   Component Value Date    TRIG 108 02/22/2024    CHOL 210 (H) 02/22/2024    LDLCALC 130 (H) 02/22/2024    HDL 58.8 02/22/2024     No results found for: \"BMCBC\", \"CBCDIF\"     PHARMACEUTICAL ASSESSMENT:    MEDICATION " RECONCILIATION    Home Pharmacy Reviewed? Yes, describe: [facility]    Added:  - CoQ-10  Changed:  - none  Removed:  - none    Drug Interactions? No    Medication Documentation Review Audit       Reviewed by Charmaine Reed MA (Medical Assistant) on 02/24/25 at 0755      Medication Order Taking? Sig Documenting Provider Last Dose Status   aspirin 81 mg EC tablet [id number] Yes Take 1 tablet (81 mg) by mouth once daily. Historical Provider, MD  Active   Eliquis 5 mg tablet [id number] Yes Take 1 tablet (5 mg) by mouth 2 times a day. Elgin Singh MD  Active   multivitamin capsule [id number] Yes Take 1 capsule by mouth once daily. Historical Provider, MD  Active   TURMERIC ORAL [id number]  Take by mouth. Historical Provider, MD  Active                    DISEASE MANAGEMENT ASSESSMENT:     ANTICOAGULATION ASSESSMENT    The ASCVD Risk score (Taylor CAVAZOS, et al., 2019) failed to calculate for the following reasons:    Risk score cannot be calculated because patient has a medical history suggesting prior/existing ASCVD    DIAGNOSIS: prevention of nonvalvular atrial fibrilliation stroke and systemic embolism  - Patient is projected to be on anticoagulation indefinitely  - [id number] Score: [2] (only included if diagnosis is atrial fibrillation)   Age: [<65 (0)] [65-74 (+1)] [> 75 (+2)]: 1  Sex: [Male/Female (+1)]: 0  CHF history: [No/Yes(+1)]: 0  Hypertension history: [No/Yes(+1)]: 1  Stroke/TIA/thromboembolism history: [No/Yes(+2)]: 0  Vascular disease history (prior MI, peripheral artery disease, aortic plaque): [No/Yes(+1)]: 0  Diabetes history: [No/Yes(+1)]: 0    CURRENT PHARMACOTHERAPY:   Eliquis 5mg twice daily  Scr 0.91mg/dl  74 yoa  Weighs 117kg    RELEVANT PAST MEDICAL HISTORY:   Afib, HTN, cardiomyopathy, ICD placement    Affordability/Accessibility: high cost with name brand medications. Has been paying copay through insurance for Eliquis.  Adherence/Organization: reports adherence 8AM and 8PM  Adverse Reactions: none  reported  Recent Hospitalizations: none reported  Recent Falls/Trauma: none reported  Changes in Tobacco or Alcohol Intake:   Tobacco: does not use  Alcohol: does not use    EDUCATION/COUNSELING:   - Counseled patient on MOA, expectations, duration of therapy, contraindications, administration, and monitoring parameters  - Counseled patient of side effects that are indicative of bleeding such as dark tarry stool, unexplainable bruising, or vomiting up a coffee ground like substance    DISCUSSION/NOTES:   Reports adherence to Eliquis at today's visit. No bruising or bleeding noted at this time.  Discussed what to do when missing a dose of Eliquis. Understands to avoid doubling up on medication if this occurs.    ASSESSMENT:    Assessment/Plan   Problem List Items Addressed This Visit       Paroxysmal atrial fibrillation (Multi)     No dose adjustments needed to Eliquis at today's visit based on their age, weight, and kidney function.          Relevant Orders    Referral to Clinical Pharmacy      Patient Assistance Program (PAP)    Application for program to be submitted for the following medications: [facility]    [facility] Permanent Address: [place]   Prescription Insurance:   Yes   Members of Household: 2   Files Taxes: Yes       Patient will be email financial information to pharmacist directly at [email or web address].    Patient verbally reports monthly or yearly income which is less than 400% federal poverty level    Patient aware this process may take up to 6 weeks.     If approved medication must be filled through [facility] and MEDICATION WILL BE MAILED TO PATIENT.         RECOMMENDATIONS/PLAN:    CONTINUE  Eliquis 5mg BID    Last Appnt with Referring Provider: 6/26/24  Next Appnt with Referring Provider: 6/25/25  Clinical Pharmacist follow up: 4/16/25  Type of Encounter: Virtual    Marcial Palmer PharmD    Verbal consent to manage patient's drug therapy was obtained from the patient  . They were informed they may decline to participate or withdraw from participation in pharmacy services at any time.    Continue all meds under the continuation of care with the referring provider and clinical pharmacy team.

## 2025-03-11 ENCOUNTER — APPOINTMENT (OUTPATIENT)
Dept: PHARMACY | Facility: HOSPITAL | Age: 75
End: 2025-03-11
Payer: MEDICARE

## 2025-03-11 DIAGNOSIS — I48.0 PAROXYSMAL ATRIAL FIBRILLATION (MULTI): ICD-10-CM

## 2025-03-11 RX ORDER — UBIDECARENONE 30 MG
30 CAPSULE ORAL 2 TIMES DAILY
COMMUNITY

## 2025-03-12 ENCOUNTER — ANCILLARY PROCEDURE (OUTPATIENT)
Dept: CARDIOLOGY | Facility: CLINIC | Age: 75
End: 2025-03-12
Payer: MEDICARE

## 2025-03-12 DIAGNOSIS — I42.9 CARDIOMYOPATHY, UNSPECIFIED TYPE (MULTI): ICD-10-CM

## 2025-03-12 DIAGNOSIS — Z95.810 ICD (IMPLANTABLE CARDIOVERTER-DEFIBRILLATOR) IN PLACE: ICD-10-CM

## 2025-03-26 ENCOUNTER — APPOINTMENT (OUTPATIENT)
Dept: CARDIOLOGY | Facility: HOSPITAL | Age: 75
End: 2025-03-26
Payer: MEDICARE

## 2025-04-08 PROCEDURE — RXMED WILLOW AMBULATORY MEDICATION CHARGE

## 2025-04-11 ENCOUNTER — TELEPHONE (OUTPATIENT)
Dept: PHARMACY | Facility: HOSPITAL | Age: 75
End: 2025-04-11
Payer: MEDICARE

## 2025-04-11 NOTE — TELEPHONE ENCOUNTER
Patient Assistance Program Approval:     We are pleased to inform you that your application for assistance has been approved.     This approval is valid through  3/8/26  as long as the following criteria continue to be satisfied:     Your medication (Eliquis) remains covered under your current insurance plan.   Your prescriber does not discontinue therapy.   You do not seek reimbursement from any other private or government-funded programs for the  medication.    Under this program, the pharmacy will first bill your insurance plan for your indemnified specified medication. The [employer] Assistance Fund will then offset your copay balance, so that your out-of pocket expense for your specialty medication will be $0.00.    Marcial Palmer, PharmD

## 2025-04-14 ENCOUNTER — PHARMACY VISIT (OUTPATIENT)
Dept: PHARMACY | Facility: CLINIC | Age: 75
End: 2025-04-14
Payer: COMMERCIAL

## 2025-04-16 ENCOUNTER — APPOINTMENT (OUTPATIENT)
Dept: PHARMACY | Facility: HOSPITAL | Age: 75
End: 2025-04-16
Payer: MEDICARE

## 2025-04-16 NOTE — PROGRESS NOTES
Cardiac Electrophysiology Office Visit   I had the pleasure seeing Fabricio Bajwa    Current Outpatient Medications   Medication Instructions    aspirin 81 mg, Daily    co-enzyme Q-10 30 mg, 2 times daily    Eliquis 5 mg, oral, 2 times daily    multivitamin capsule 1 capsule, Daily RT    TURMERIC ORAL 1 capsule, Daily      Subjective    Fabricio Bajwa is a 74 y.o. male .        Chief Complaint   Patient presents with    Cardiomyopathy    Atrial Fibrillation     OUTPATIENT CONSULTATION: Cardiac Electrophysiology  DOS: 04/23/2025    REASON: Device management       HPI     Fabricio Bajwa has a past medical history of   CARDIAC HISTORY:  CAD - MI s/p PCI to LCX (2014) [facility]ience 2/5/2028 DONTAE,   ICM - s/p CD defibrillator Medtronic, implanted (Mar 2014). Serial number BB [id number], model number DD BB 1 D4 by Dr. Wilmer Patel.   Paroxysmal AF     Oct 2022:  AT / AF plus SVT noted on Dual-chamber ICD check      April 2023: Inappropriate AICD shock -according to [facility] cardiology note, was due to AF with RVR entering the VF zone and reset patient into NSR.   Metoprolol increased to 50 mg BID (from 25 mg BID) and reprogrammed VF zone to 194 bpm and f/u visit in July Sept 2024 - Presented to the office with dizziness. Aortic murmur was heard on exam with slight radiation up to the right carotid. ECHO ordered.    Patient presents today to discuss how to proceed with device management. Per last device check the battery status: Device @ RRT as of 3/10/25.    RELEVANT TESTING:     Transthoracic echo (TTE) complete 10/11/2024   CONCLUSIONS:   1. Left ventricular ejection fraction is mildly decreased, by visual estimate at 40-45%.   2. Abnormal left venticular wall motion.   3. Spectral Doppler shows an impaired relaxation pattern of left ventricular diastolic filling.   4. Left ventricular cavity size is mildly dilated.   5. There is severe hypokinesis with scarring of the basal and mid inferoposterior wall.      There is  mild lateral wall hypokinesis.   6. There is normal right ventricular global systolic function.   7. The left atrium is mildly dilated.   8. There is mitral valve chordal calcification.   9. There is moderate mitral annular calcification.  10. Trace mitral valve regurgitation.  11. Mild tricuspid regurgitation visualized.  12. Slightly elevated right ventricular systolic pressure.  13. The estimated PASP is 35 mmHg.    Lab Review:   Lab Results   Component Value Date    WBC 6.8 01/24/2024    HGB 14.8 01/24/2024    HCT 45.3 01/24/2024     01/24/2024    CHOL 210 (H) 02/22/2024    TRIG 108 02/22/2024    HDL 58.8 02/22/2024    ALT 18 02/22/2024    AST 25 02/22/2024     02/22/2024    K 3.9 02/22/2024     02/22/2024    CREATININE 0.91 02/22/2024    BUN 14 02/22/2024    CO2 29 02/22/2024    TSH 1.96 02/28/2020    PSA 1.6 02/28/2020    INR 1.1 01/24/2024    HGBA1C 5.9 02/08/2023       Review of Systems   All other systems reviewed and are negative.       Objective    Cardiovascular:      PMI at left midclavicular line. Normal rate. Regular rhythm. Normal S1. Normal S2.       Murmurs: There is no murmur.      No gallop.  No click. No rub.   Pulses:     Intact distal pulses.   Edema:     Peripheral edema absent.            Assessment/Plan   Coronary artery disease involving native coronary artery  s/p PCI to LCX (2014) Xience 2/5/2028 [facility], at [facility] during what appears to be acute MI     -[facility] 10/28/2022:  Native Coronary Artery Disease in the LCX (Patent stent in Mid Lcx, 20% mild tubular stenosis proximal to the stent.)        Continue ASA, Atorvastatin at current doses.     Cardiomyopathy, ischemic  His EF back in 2021 was normal. LV dysfunction of 45% was noted on the coronary angiogram in October of 2022.  He had started fairly recently Lisinopril and Metoprolol the latter for atrial fibrillation. We will order an ECHO. He has lost significant amount of weight so this is commendable.  He wants to stop the Lisinopril and Metoprolol. As the Metoprolol was started for atrial fibrillation and his EF is preserved I feel we can stop the Metoprolol. I will await for the ECHO to decide on Lisinopril he will continue it for now.     Presence of automatic (implantable) cardiac defibrillator  s/p CD defibrillator Medtronic, implanted (Mar 2014).  Serial number BB [id number], model number DD BB 1 D4 by Dr. Wilmer Patel.April 2023: Inappropriate AICD shock -according to [facility] cardiology note, was due to AF with RVR entering the VF zone and reset patient into NSR.   Metoprolol increased to 50 mg BID (from 25 mg BID) and reprogrammed VF zone to 194 bpm and f/u visit in July noted device .      Today he comes because he wants his defibrillator turned off. He was scheduled for change out and he cancelled the procedure. In the past he has had inappropriate shocks and feels that the defibrillator is doing more harm than good. His initial device was placed in 2014 when after 6 months of optimal medical therapy the EF was found still to be around 35% and he had non sustained VT. Hence it was done for primary prevention. He had never had appropriate shock and his EF has recovered. We discussed the pros and cons of turning off the defibrillator vs removing it. Since he insist on turning it off we will proceed with it.    Intermittent atrial fibrillation (CMS/HCC)  This is another issue that bothers him. He apparently has a very low burden of AF but it is worried about the inappropriate shocks. His device check shows AF, AT and SVT.  He wants to stop the Eliquis. He is s/p PVI 02/2024.

## 2025-04-17 DIAGNOSIS — I25.5 CARDIOMYOPATHY, ISCHEMIC: Primary | ICD-10-CM

## 2025-04-23 ENCOUNTER — HOSPITAL ENCOUNTER (OUTPATIENT)
Dept: CARDIOLOGY | Facility: CLINIC | Age: 75
Discharge: HOME | End: 2025-04-23
Payer: MEDICARE

## 2025-04-23 ENCOUNTER — OFFICE VISIT (OUTPATIENT)
Dept: CARDIOLOGY | Facility: CLINIC | Age: 75
End: 2025-04-23
Payer: MEDICARE

## 2025-04-23 DIAGNOSIS — I48.91 ATRIAL FIBRILLATION, UNSPECIFIED TYPE (MULTI): ICD-10-CM

## 2025-04-23 DIAGNOSIS — I25.5 CARDIOMYOPATHY, ISCHEMIC: ICD-10-CM

## 2025-04-23 DIAGNOSIS — I48.91 ATRIAL FIBRILLATION, UNSPECIFIED TYPE (MULTI): Primary | ICD-10-CM

## 2025-04-23 DIAGNOSIS — Z45.010 PACEMAKER AT END OF BATTERY LIFE: ICD-10-CM

## 2025-04-23 DIAGNOSIS — I48.0 PAF (PAROXYSMAL ATRIAL FIBRILLATION) (MULTI): ICD-10-CM

## 2025-04-23 DIAGNOSIS — Z95.810 ICD (IMPLANTABLE CARDIOVERTER-DEFIBRILLATOR) IN PLACE: ICD-10-CM

## 2025-04-23 PROCEDURE — 93005 ELECTROCARDIOGRAM TRACING: CPT | Performed by: INTERNAL MEDICINE

## 2025-04-23 PROCEDURE — 99215 OFFICE O/P EST HI 40 MIN: CPT | Performed by: INTERNAL MEDICINE

## 2025-04-23 PROCEDURE — G2211 COMPLEX E/M VISIT ADD ON: HCPCS | Performed by: INTERNAL MEDICINE

## 2025-05-19 ENCOUNTER — APPOINTMENT (OUTPATIENT)
Dept: PRIMARY CARE | Facility: CLINIC | Age: 75
End: 2025-05-19
Payer: MEDICARE

## 2025-05-27 ENCOUNTER — APPOINTMENT (OUTPATIENT)
Dept: PRIMARY CARE | Facility: CLINIC | Age: 75
End: 2025-05-27
Payer: MEDICARE

## 2025-05-27 ENCOUNTER — APPOINTMENT (OUTPATIENT)
Dept: CARDIOLOGY | Facility: HOSPITAL | Age: 75
End: 2025-05-27
Payer: MEDICARE

## 2025-06-24 ENCOUNTER — APPOINTMENT (OUTPATIENT)
Dept: CARDIOLOGY | Facility: HOSPITAL | Age: 75
End: 2025-06-24
Payer: MEDICARE

## 2025-06-24 PROCEDURE — RXMED WILLOW AMBULATORY MEDICATION CHARGE

## 2025-06-25 ENCOUNTER — PHARMACY VISIT (OUTPATIENT)
Dept: PHARMACY | Facility: CLINIC | Age: 75
End: 2025-06-25
Payer: COMMERCIAL

## 2025-06-25 ENCOUNTER — APPOINTMENT (OUTPATIENT)
Dept: CARDIOLOGY | Facility: CLINIC | Age: 75
End: 2025-06-25
Payer: MEDICARE

## 2026-01-22 ENCOUNTER — APPOINTMENT (OUTPATIENT)
Dept: CARDIOLOGY | Facility: CLINIC | Age: 76
End: 2026-01-22
Payer: MEDICARE

## (undated) DEVICE — NEEDLE, NRG TRANSSEPTAL, 98CM, CURVE C0

## (undated) DEVICE — CATHETER, DIAGNOSTIC, SOUNDSTAR ECO SMS, 8FR

## (undated) DEVICE — INTRODUCER, HEMOSTASIS, STR/J .038 IN, 8.5FR 12CM

## (undated) DEVICE — CABLE, CARTO 3 SYSTEM, ECO INTERFACE, 34-PIN, SPLIT HANDLE (REPROCESSED)

## (undated) DEVICE — CABLE, 34 HYP, 34 LEMO, 10FT, SMART TOUCH (REPROCESS)

## (undated) DEVICE — TUBING SET, IRRIGATION, SMARTABLATE

## (undated) DEVICE — SHEATH, STEERABLE, SUREFLEX, MEDIUM CURVE

## (undated) DEVICE — CATHETER, PENTARAY, NAV ECO, 7FR, 2-6-2 SPACING, D CURVE

## (undated) DEVICE — PATCHES, EXTERNAL REFERENCE, CARTO3

## (undated) DEVICE — CATHETER, THERMOCOOL SMART TOUCH, SF, D-F CURVE

## (undated) DEVICE — CLOSURE SYSTEM, VASCULAR, MVP 6-12FR, VENOUS

## (undated) DEVICE — INTERFACE CABLE, EXISITING DX CATHETERS, BLUE PORT (REPROCESS)

## (undated) DEVICE — CABLE, CONNECTOR, 10FT

## (undated) DEVICE — CATHETHER, CS, BI-DIRECTIONAL, 10 POLES, D-F TYPE

## (undated) DEVICE — INTRODUCER, SHEATH, FAST-CATH, 8FR X 12CM, C-LOCK